# Patient Record
Sex: FEMALE | Race: WHITE | NOT HISPANIC OR LATINO
[De-identification: names, ages, dates, MRNs, and addresses within clinical notes are randomized per-mention and may not be internally consistent; named-entity substitution may affect disease eponyms.]

---

## 2014-05-07 RX ORDER — LISINOPRIL 2.5 MG/1
2 TABLET ORAL
Qty: 0 | Refills: 0 | COMMUNITY
Start: 2014-05-07

## 2014-05-07 RX ORDER — ATORVASTATIN CALCIUM 80 MG/1
1 TABLET, FILM COATED ORAL
Qty: 0 | Refills: 0 | COMMUNITY
Start: 2014-05-07

## 2017-03-10 ENCOUNTER — APPOINTMENT (OUTPATIENT)
Dept: CARDIOLOGY | Facility: CLINIC | Age: 82
End: 2017-03-10

## 2017-03-10 ENCOUNTER — NON-APPOINTMENT (OUTPATIENT)
Age: 82
End: 2017-03-10

## 2017-03-10 VITALS
DIASTOLIC BLOOD PRESSURE: 71 MMHG | BODY MASS INDEX: 22.63 KG/M2 | WEIGHT: 123 LBS | OXYGEN SATURATION: 94 % | HEIGHT: 62 IN | HEART RATE: 69 BPM | SYSTOLIC BLOOD PRESSURE: 123 MMHG

## 2017-07-11 ENCOUNTER — NON-APPOINTMENT (OUTPATIENT)
Age: 82
End: 2017-07-11

## 2017-07-11 ENCOUNTER — APPOINTMENT (OUTPATIENT)
Dept: CARDIOLOGY | Facility: CLINIC | Age: 82
End: 2017-07-11

## 2017-07-11 VITALS
SYSTOLIC BLOOD PRESSURE: 147 MMHG | WEIGHT: 130 LBS | BODY MASS INDEX: 23.78 KG/M2 | HEART RATE: 68 BPM | DIASTOLIC BLOOD PRESSURE: 55 MMHG | OXYGEN SATURATION: 96 % | RESPIRATION RATE: 16 BRPM

## 2017-12-18 ENCOUNTER — NON-APPOINTMENT (OUTPATIENT)
Age: 82
End: 2017-12-18

## 2017-12-18 ENCOUNTER — APPOINTMENT (OUTPATIENT)
Dept: CARDIOLOGY | Facility: CLINIC | Age: 82
End: 2017-12-18
Payer: MEDICAID

## 2017-12-18 VITALS
OXYGEN SATURATION: 95 % | HEIGHT: 62 IN | RESPIRATION RATE: 16 BRPM | BODY MASS INDEX: 23.37 KG/M2 | WEIGHT: 127 LBS | HEART RATE: 68 BPM | DIASTOLIC BLOOD PRESSURE: 77 MMHG | SYSTOLIC BLOOD PRESSURE: 135 MMHG

## 2017-12-18 PROCEDURE — 99215 OFFICE O/P EST HI 40 MIN: CPT

## 2017-12-18 PROCEDURE — 93000 ELECTROCARDIOGRAM COMPLETE: CPT

## 2018-03-06 ENCOUNTER — RX RENEWAL (OUTPATIENT)
Age: 83
End: 2018-03-06

## 2018-04-03 ENCOUNTER — RX RENEWAL (OUTPATIENT)
Age: 83
End: 2018-04-03

## 2018-04-20 ENCOUNTER — NON-APPOINTMENT (OUTPATIENT)
Age: 83
End: 2018-04-20

## 2018-04-20 ENCOUNTER — APPOINTMENT (OUTPATIENT)
Dept: CARDIOLOGY | Facility: CLINIC | Age: 83
End: 2018-04-20
Payer: MEDICAID

## 2018-04-20 VITALS
SYSTOLIC BLOOD PRESSURE: 156 MMHG | HEART RATE: 74 BPM | WEIGHT: 128 LBS | OXYGEN SATURATION: 96 % | BODY MASS INDEX: 23.55 KG/M2 | HEIGHT: 62 IN | DIASTOLIC BLOOD PRESSURE: 74 MMHG

## 2018-04-20 PROCEDURE — 99215 OFFICE O/P EST HI 40 MIN: CPT

## 2018-04-20 PROCEDURE — 93000 ELECTROCARDIOGRAM COMPLETE: CPT

## 2018-10-30 ENCOUNTER — APPOINTMENT (OUTPATIENT)
Dept: CARDIOLOGY | Facility: CLINIC | Age: 83
End: 2018-10-30
Payer: MEDICARE

## 2018-10-30 VITALS
DIASTOLIC BLOOD PRESSURE: 70 MMHG | WEIGHT: 128 LBS | BODY MASS INDEX: 23.55 KG/M2 | HEART RATE: 67 BPM | OXYGEN SATURATION: 95 % | SYSTOLIC BLOOD PRESSURE: 135 MMHG | HEIGHT: 62 IN

## 2018-10-30 PROCEDURE — 99215 OFFICE O/P EST HI 40 MIN: CPT

## 2018-10-30 PROCEDURE — 93000 ELECTROCARDIOGRAM COMPLETE: CPT

## 2019-02-15 ENCOUNTER — INPATIENT (INPATIENT)
Facility: HOSPITAL | Age: 84
LOS: 0 days | Discharge: HOME | End: 2019-02-16
Attending: STUDENT IN AN ORGANIZED HEALTH CARE EDUCATION/TRAINING PROGRAM | Admitting: STUDENT IN AN ORGANIZED HEALTH CARE EDUCATION/TRAINING PROGRAM
Payer: MEDICARE

## 2019-02-15 VITALS
SYSTOLIC BLOOD PRESSURE: 158 MMHG | DIASTOLIC BLOOD PRESSURE: 85 MMHG | OXYGEN SATURATION: 97 % | HEART RATE: 74 BPM | TEMPERATURE: 99 F | RESPIRATION RATE: 16 BRPM

## 2019-02-15 DIAGNOSIS — R07.9 CHEST PAIN, UNSPECIFIED: ICD-10-CM

## 2019-02-15 DIAGNOSIS — Z43.4 ENCOUNTER FOR ATTENTION TO OTHER ARTIFICIAL OPENINGS OF DIGESTIVE TRACT: Chronic | ICD-10-CM

## 2019-02-15 LAB
ALBUMIN SERPL ELPH-MCNC: 4.4 G/DL — SIGNIFICANT CHANGE UP (ref 3.3–5)
ALP SERPL-CCNC: 54 U/L — SIGNIFICANT CHANGE UP (ref 40–120)
ALT FLD-CCNC: 20 U/L — SIGNIFICANT CHANGE UP (ref 4–33)
ANION GAP SERPL CALC-SCNC: 15 MMO/L — HIGH (ref 7–14)
AST SERPL-CCNC: 31 U/L — SIGNIFICANT CHANGE UP (ref 4–32)
BASE EXCESS BLDV CALC-SCNC: 1.8 MMOL/L — SIGNIFICANT CHANGE UP
BASOPHILS # BLD AUTO: 0.05 K/UL — SIGNIFICANT CHANGE UP (ref 0–0.2)
BASOPHILS NFR BLD AUTO: 0.7 % — SIGNIFICANT CHANGE UP (ref 0–2)
BILIRUB SERPL-MCNC: 0.3 MG/DL — SIGNIFICANT CHANGE UP (ref 0.2–1.2)
BLOOD GAS VENOUS - CREATININE: 0.8 MG/DL — SIGNIFICANT CHANGE UP (ref 0.5–1.3)
BUN SERPL-MCNC: 25 MG/DL — HIGH (ref 7–23)
CALCIUM SERPL-MCNC: 9.7 MG/DL — SIGNIFICANT CHANGE UP (ref 8.4–10.5)
CHLORIDE BLDV-SCNC: 110 MMOL/L — HIGH (ref 96–108)
CHLORIDE SERPL-SCNC: 104 MMOL/L — SIGNIFICANT CHANGE UP (ref 98–107)
CO2 SERPL-SCNC: 22 MMOL/L — SIGNIFICANT CHANGE UP (ref 22–31)
CREAT SERPL-MCNC: 0.96 MG/DL — SIGNIFICANT CHANGE UP (ref 0.5–1.3)
EOSINOPHIL # BLD AUTO: 0.08 K/UL — SIGNIFICANT CHANGE UP (ref 0–0.5)
EOSINOPHIL NFR BLD AUTO: 1.1 % — SIGNIFICANT CHANGE UP (ref 0–6)
GAS PNL BLDV: 133 MMOL/L — LOW (ref 136–146)
GLUCOSE BLDV-MCNC: 94 — SIGNIFICANT CHANGE UP (ref 70–99)
GLUCOSE SERPL-MCNC: 94 MG/DL — SIGNIFICANT CHANGE UP (ref 70–99)
HCO3 BLDV-SCNC: 26 MMOL/L — SIGNIFICANT CHANGE UP (ref 20–27)
HCT VFR BLD CALC: 43 % — SIGNIFICANT CHANGE UP (ref 34.5–45)
HCT VFR BLDV CALC: 43.4 % — SIGNIFICANT CHANGE UP (ref 34.5–45)
HGB BLD-MCNC: 14 G/DL — SIGNIFICANT CHANGE UP (ref 11.5–15.5)
HGB BLDV-MCNC: 14.1 G/DL — SIGNIFICANT CHANGE UP (ref 11.5–15.5)
IMM GRANULOCYTES NFR BLD AUTO: 0.3 % — SIGNIFICANT CHANGE UP (ref 0–1.5)
LACTATE BLDV-MCNC: 1.5 MMOL/L — SIGNIFICANT CHANGE UP (ref 0.5–2)
LYMPHOCYTES # BLD AUTO: 1.8 K/UL — SIGNIFICANT CHANGE UP (ref 1–3.3)
LYMPHOCYTES # BLD AUTO: 24.7 % — SIGNIFICANT CHANGE UP (ref 13–44)
MCHC RBC-ENTMCNC: 30.9 PG — SIGNIFICANT CHANGE UP (ref 27–34)
MCHC RBC-ENTMCNC: 32.6 % — SIGNIFICANT CHANGE UP (ref 32–36)
MCV RBC AUTO: 94.9 FL — SIGNIFICANT CHANGE UP (ref 80–100)
MONOCYTES # BLD AUTO: 0.69 K/UL — SIGNIFICANT CHANGE UP (ref 0–0.9)
MONOCYTES NFR BLD AUTO: 9.5 % — SIGNIFICANT CHANGE UP (ref 2–14)
NEUTROPHILS # BLD AUTO: 4.64 K/UL — SIGNIFICANT CHANGE UP (ref 1.8–7.4)
NEUTROPHILS NFR BLD AUTO: 63.7 % — SIGNIFICANT CHANGE UP (ref 43–77)
NRBC # FLD: 0 K/UL — LOW (ref 25–125)
NT-PROBNP SERPL-SCNC: 1050 PG/ML — SIGNIFICANT CHANGE UP
PCO2 BLDV: 44 MMHG — SIGNIFICANT CHANGE UP (ref 41–51)
PH BLDV: 7.39 PH — SIGNIFICANT CHANGE UP (ref 7.32–7.43)
PLATELET # BLD AUTO: 171 K/UL — SIGNIFICANT CHANGE UP (ref 150–400)
PMV BLD: 11.5 FL — SIGNIFICANT CHANGE UP (ref 7–13)
PO2 BLDV: 63 MMHG — HIGH (ref 35–40)
POTASSIUM BLDV-SCNC: 4.5 MMOL/L — SIGNIFICANT CHANGE UP (ref 3.4–4.5)
POTASSIUM SERPL-MCNC: 4.8 MMOL/L — SIGNIFICANT CHANGE UP (ref 3.5–5.3)
POTASSIUM SERPL-SCNC: 4.8 MMOL/L — SIGNIFICANT CHANGE UP (ref 3.5–5.3)
PROT SERPL-MCNC: 7.1 G/DL — SIGNIFICANT CHANGE UP (ref 6–8.3)
RBC # BLD: 4.53 M/UL — SIGNIFICANT CHANGE UP (ref 3.8–5.2)
RBC # FLD: 13.3 % — SIGNIFICANT CHANGE UP (ref 10.3–14.5)
SAO2 % BLDV: 90.3 % — HIGH (ref 60–85)
SODIUM SERPL-SCNC: 141 MMOL/L — SIGNIFICANT CHANGE UP (ref 135–145)
TROPONIN T, HIGH SENSITIVITY: 14 NG/L — SIGNIFICANT CHANGE UP (ref ?–14)
WBC # BLD: 7.28 K/UL — SIGNIFICANT CHANGE UP (ref 3.8–10.5)
WBC # FLD AUTO: 7.28 K/UL — SIGNIFICANT CHANGE UP (ref 3.8–10.5)

## 2019-02-15 PROCEDURE — 71046 X-RAY EXAM CHEST 2 VIEWS: CPT | Mod: 26

## 2019-02-15 RX ORDER — ASPIRIN/CALCIUM CARB/MAGNESIUM 324 MG
324 TABLET ORAL ONCE
Qty: 0 | Refills: 0 | Status: COMPLETED | OUTPATIENT
Start: 2019-02-15 | End: 2019-02-15

## 2019-02-15 RX ADMIN — Medication 324 MILLIGRAM(S): at 22:50

## 2019-02-15 NOTE — ED ADULT NURSE NOTE - OBJECTIVE STATEMENT
pt a&o x3, Lithuanian speaking, son at bedside as preferred , sent by urgent care for abnormal ekg. pt states she had chest pain last night that felt similar to her MI in 2014, accompanied by sob and dizziness. currently denies pain. states she went to urgent care where she states they said "I cant tell if another MI." vs as documented. ekg obtained. right forearm 20g placed. labs drawn and sent. awaiting md eval nad note

## 2019-02-15 NOTE — ED PROVIDER NOTE - ATTENDING CONTRIBUTION TO CARE
Dr. Cosby: I have personally seen and examined this patient at the bedside. I have fully participated in the care of this patient. I have reviewed all pertinent clinical information, including history, physical exam, plan and the Resident's note and agree except as noted. HPI above as by me. PE above as by me. DDX high risk acs PLAN asa, xr, trops, admit

## 2019-02-15 NOTE — ED PROVIDER NOTE - PMH
Diverticulosis    GERD (gastroesophageal reflux disease)    Glaucoma    Hyperlipidemia    Hypertension

## 2019-02-15 NOTE — CONSULT NOTE ADULT - PROBLEM SELECTOR RECOMMENDATION 9
Patient presents with intermittent chest pain  Trend CE   TTE in AM to evaluate LV/RV function   consider Ischemic workup   monitor on telemetry

## 2019-02-15 NOTE — ED PROVIDER NOTE - OBJECTIVE STATEMENT
86F with hx of CAD s/p MI, Glaucoma, Parkinsons p/w left sided CP x1 week. pain has been intermittent and radiating to her left shoulder, sharp sticking sensation. also reports difficulty breathing associated with CP. States sx are similar to when she had a heart attack in the past. denies any current CP at this time. (+) dry cough. 86F with hx of CAD s/p MI, Glaucoma, Parkinsons p/w left sided CP x1 week. pain has been intermittent and radiating to her left shoulder, sharp sticking sensation. also reports difficulty breathing associated with CP. States sx are similar to when she had a heart attack in the past. denies any current CP at this time. (+) dry cough.     22:20 Cosby att: 86F h/o cad, 2014 mi no stents c/o cp x 1 wk. Patient Wolof and some English speaking, requests son translate rather than phone. Last week patient felt near faint, could not breathe and noted left sided pinching cp, intermitt rad to left chest. Occurs in her sleep. "Feels similar to 2014 heart attack." Missed an appointment with her Cardiologist Kayla Alas last week. Denies active cp. Per family neg change in skin color. Patient denies f, cough, melena.

## 2019-02-15 NOTE — ED ADULT TRIAGE NOTE - CHIEF COMPLAINT QUOTE
Pt c/o CP a few days ago and last night so [facility] and was told she has an abnormal EKG and should go to ER.  PMH MI 2014.  C/O dizziness

## 2019-02-15 NOTE — ED PROVIDER NOTE - CLINICAL SUMMARY MEDICAL DECISION MAKING FREE TEXT BOX
86F with CP and sob, r/o ACS. check labs with trop, ekg, cxr. asa. 86F with CP and sob, r/o ACS. check labs with trop, ekg, cxr. asa.    Alea attt: high risk acs PLAN asa, xr, trops, admit

## 2019-02-15 NOTE — ED ADULT NURSE NOTE - ED STAT RN HANDOFF DETAILS
pt a&o x3, nad noted. no c/o pain. connected to cardiac monitor. safety maintained. endorsed to brent penn

## 2019-02-15 NOTE — CONSULT NOTE ADULT - SUBJECTIVE AND OBJECTIVE BOX
Date of Admission: 2/15/2019    CHIEF COMPLAINT: Chest pain    HISTORY OF PRESENT ILLNESS:  This is a 86yoM w/ PMHx CAD w/ stents, HFrEF EF 35%, HTN, HLD presents with intermittent chest pain that started last week.  Patient states she experiences these episodes randomly while at rest for about 5 minutes and is relieved on its own.  Her son took her to the urgent care center and sent her to the ED to be evaluated.  While in ED, patient was loaded with ASA.  Initial hsT 14.  Upon examination, patient states her chest pain is resolved;  Denies any palpitations, fever, cough, chills, n/v/d.    Allergies  No Known Allergies    MEDICATIONS:  Aspirin 81 MG Oral Tablet Delayed Release; TAKE 1 TABLET DAILY  Atorvastatin Calcium 20 MG Oral Tablet; TAKE 1 TABLET DAILY  Dorzolamide HCl - 2 % Ophthalmic Solution; INSTILL 1 DROP IN THE LEFT EYE TWICE  DAILY  Latanoprost 0.005 % Ophthalmic Solution; INSTILL 1 DROP IN BOTH EYES AT BEDTIME  Lisinopril 2.5 MG Oral Tablet; Take 1 tablet daily  Metoprolol Succinate ER 25 MG Oral Tablet Extended Release 24 Hour; TAKE 1 TABLET  DAILY  Pantoprazole Sodium 40 MG Oral Tablet Delayed Release; TAKE 1 TABLET BY MOUTH  DAILY  Protonix 40 MG Oral Tablet Delayed Release; TAKE 1 TABLET DAILY  Refresh SOLN; INSTILL 1 DROP Twice daily  Valium 10 MG Oral Tablet; TAKE 1 TABLET 3 TIMES DAILY AS NEEDED    PAST MEDICAL & SURGICAL HISTORY:  Glaucoma  Diverticulosis  GERD (gastroesophageal reflux disease)  Hyperlipidemia  Hypertension  Cholecystostomy care    REVIEW OF SYSTEMS:  See HPI. Otherwise, 10 point ROS done and otherwise negative.    PHYSICAL EXAM:  T(C): 36.3 (02-15-19 @ 21:05), Max: 37.1 (02-15-19 @ 19:04)  HR: 66 (02-15-19 @ 21:05) (66 - 74)  BP: 171/57 (02-15-19 @ 21:05) (158/85 - 171/57)  RR: 18 (02-15-19 @ 21:05) (16 - 18)  SpO2: 100% (02-15-19 @ 21:05) (97% - 100%)  Wt(kg): --  I&O's Summary      Appearance: Normal	  HEENT:   Normal oral mucosa, PERRL, EOMI	  Lymphatic: No lymphadenopathy  Cardiovascular: Normal S1 S2, No JVD, No murmurs, No edema  Respiratory: Lungs clear to auscultation	  Psychiatry: A & O x 3, Mood & affect appropriate  Gastrointestinal:  Soft, Non-tender, + BS	  Skin: No rashes, No ecchymoses, No cyanosis	  Neurologic: Non-focal  Extremities: Normal range of motion, No clubbing, cyanosis or edema  Vascular: Peripheral pulses palpable 2+ bilaterally    LABS:	 	  CBC Full  -  ( 15 Feb 2019 21:09 )  WBC Count : 7.28 K/uL  Hemoglobin : 14.0 g/dL  Hematocrit : 43.0 %  Platelet Count - Automated : 171 K/uL  Mean Cell Volume : 94.9 fL  Mean Cell Hemoglobin : 30.9 pg  Mean Cell Hemoglobin Concentration : 32.6 %  Auto Neutrophil # : 4.64 K/uL  Auto Lymphocyte # : 1.80 K/uL  Auto Monocyte # : 0.69 K/uL  Auto Eosinophil # : 0.08 K/uL  Auto Basophil # : 0.05 K/uL  Auto Neutrophil % : 63.7 %  Auto Lymphocyte % : 24.7 %  Auto Monocyte % : 9.5 %  Auto Eosinophil % : 1.1 %  Auto Basophil % : 0.7 %    02-15    141  |  104  |  25<H>  ----------------------------<  94  4.8   |  22  |  0.96    Ca    9.7      15 Feb 2019 20:50    TPro  7.1  /  Alb  4.4  /  TBili  0.3  /  DBili  x   /  AST  31  /  ALT  20  /  AlkPhos  54  02-15      proBNP: Serum Pro-Brain Natriuretic Peptide: 1050 pg/mL (02-15 @ 20:50)

## 2019-02-15 NOTE — CONSULT NOTE ADULT - ASSESSMENT
86yoM w/ PMHx CAD w/ stents, HFrEF EF 35%, HTN, HLD presents with intermittent chest pain that started last week.  Patient states she experiences these episodes randomly while at rest for about 5 minutes and is relieved on its own.

## 2019-02-15 NOTE — ED ADULT NURSE NOTE - CHIEF COMPLAINT QUOTE
Pt c/o CP a few days ago and [facility] has [facility] abnormal [facility] and should go to ER.  PMH MI 2014.  C/O dizziness

## 2019-02-16 ENCOUNTER — TRANSCRIPTION ENCOUNTER (OUTPATIENT)
Age: 84
End: 2019-02-16

## 2019-02-16 VITALS
DIASTOLIC BLOOD PRESSURE: 67 MMHG | SYSTOLIC BLOOD PRESSURE: 140 MMHG | HEART RATE: 70 BPM | OXYGEN SATURATION: 98 % | TEMPERATURE: 98 F | RESPIRATION RATE: 18 BRPM

## 2019-02-16 DIAGNOSIS — I50.9 HEART FAILURE, UNSPECIFIED: ICD-10-CM

## 2019-02-16 DIAGNOSIS — I50.22 CHRONIC SYSTOLIC (CONGESTIVE) HEART FAILURE: ICD-10-CM

## 2019-02-16 DIAGNOSIS — I25.10 ATHEROSCLEROTIC HEART DISEASE OF NATIVE CORONARY ARTERY WITHOUT ANGINA PECTORIS: ICD-10-CM

## 2019-02-16 DIAGNOSIS — E78.5 HYPERLIPIDEMIA, UNSPECIFIED: ICD-10-CM

## 2019-02-16 DIAGNOSIS — I10 ESSENTIAL (PRIMARY) HYPERTENSION: ICD-10-CM

## 2019-02-16 DIAGNOSIS — K21.9 GASTRO-ESOPHAGEAL REFLUX DISEASE WITHOUT ESOPHAGITIS: ICD-10-CM

## 2019-02-16 DIAGNOSIS — R07.9 CHEST PAIN, UNSPECIFIED: ICD-10-CM

## 2019-02-16 DIAGNOSIS — Z29.9 ENCOUNTER FOR PROPHYLACTIC MEASURES, UNSPECIFIED: ICD-10-CM

## 2019-02-16 LAB
ANION GAP SERPL CALC-SCNC: 13 MMO/L — SIGNIFICANT CHANGE UP (ref 7–14)
BASOPHILS # BLD AUTO: 0.04 K/UL — SIGNIFICANT CHANGE UP (ref 0–0.2)
BASOPHILS NFR BLD AUTO: 0.6 % — SIGNIFICANT CHANGE UP (ref 0–2)
BUN SERPL-MCNC: 21 MG/DL — SIGNIFICANT CHANGE UP (ref 7–23)
CALCIUM SERPL-MCNC: 9.2 MG/DL — SIGNIFICANT CHANGE UP (ref 8.4–10.5)
CHLORIDE SERPL-SCNC: 104 MMOL/L — SIGNIFICANT CHANGE UP (ref 98–107)
CHOLEST SERPL-MCNC: 126 MG/DL — SIGNIFICANT CHANGE UP (ref 120–199)
CK MB BLD-MCNC: 2.87 NG/ML — SIGNIFICANT CHANGE UP (ref 1–4.7)
CK MB BLD-MCNC: 2.99 NG/ML — SIGNIFICANT CHANGE UP (ref 1–4.7)
CK MB BLD-MCNC: 3.28 NG/ML — SIGNIFICANT CHANGE UP (ref 1–4.7)
CK MB BLD-MCNC: SIGNIFICANT CHANGE UP (ref 0–2.5)
CK SERPL-CCNC: 115 U/L — SIGNIFICANT CHANGE UP (ref 25–170)
CK SERPL-CCNC: 137 U/L — SIGNIFICANT CHANGE UP (ref 25–170)
CK SERPL-CCNC: 149 U/L — SIGNIFICANT CHANGE UP (ref 25–170)
CO2 SERPL-SCNC: 25 MMOL/L — SIGNIFICANT CHANGE UP (ref 22–31)
CREAT SERPL-MCNC: 0.87 MG/DL — SIGNIFICANT CHANGE UP (ref 0.5–1.3)
EOSINOPHIL # BLD AUTO: 0.08 K/UL — SIGNIFICANT CHANGE UP (ref 0–0.5)
EOSINOPHIL NFR BLD AUTO: 1.2 % — SIGNIFICANT CHANGE UP (ref 0–6)
GLUCOSE SERPL-MCNC: 84 MG/DL — SIGNIFICANT CHANGE UP (ref 70–99)
HBA1C BLD-MCNC: 5.7 % — HIGH (ref 4–5.6)
HCT VFR BLD CALC: 42.8 % — SIGNIFICANT CHANGE UP (ref 34.5–45)
HDLC SERPL-MCNC: 54 MG/DL — SIGNIFICANT CHANGE UP (ref 45–65)
HGB BLD-MCNC: 14.1 G/DL — SIGNIFICANT CHANGE UP (ref 11.5–15.5)
IMM GRANULOCYTES NFR BLD AUTO: 0.2 % — SIGNIFICANT CHANGE UP (ref 0–1.5)
LIPID PNL WITH DIRECT LDL SERPL: 67 MG/DL — SIGNIFICANT CHANGE UP
LYMPHOCYTES # BLD AUTO: 1.41 K/UL — SIGNIFICANT CHANGE UP (ref 1–3.3)
LYMPHOCYTES # BLD AUTO: 21.7 % — SIGNIFICANT CHANGE UP (ref 13–44)
MAGNESIUM SERPL-MCNC: 1.9 MG/DL — SIGNIFICANT CHANGE UP (ref 1.6–2.6)
MCHC RBC-ENTMCNC: 31.5 PG — SIGNIFICANT CHANGE UP (ref 27–34)
MCHC RBC-ENTMCNC: 32.9 % — SIGNIFICANT CHANGE UP (ref 32–36)
MCV RBC AUTO: 95.5 FL — SIGNIFICANT CHANGE UP (ref 80–100)
MONOCYTES # BLD AUTO: 0.55 K/UL — SIGNIFICANT CHANGE UP (ref 0–0.9)
MONOCYTES NFR BLD AUTO: 8.5 % — SIGNIFICANT CHANGE UP (ref 2–14)
NEUTROPHILS # BLD AUTO: 4.41 K/UL — SIGNIFICANT CHANGE UP (ref 1.8–7.4)
NEUTROPHILS NFR BLD AUTO: 67.8 % — SIGNIFICANT CHANGE UP (ref 43–77)
NRBC # FLD: 0 K/UL — LOW (ref 25–125)
PHOSPHATE SERPL-MCNC: 3 MG/DL — SIGNIFICANT CHANGE UP (ref 2.5–4.5)
PLATELET # BLD AUTO: 182 K/UL — SIGNIFICANT CHANGE UP (ref 150–400)
PMV BLD: 11.5 FL — SIGNIFICANT CHANGE UP (ref 7–13)
POTASSIUM SERPL-MCNC: 4.8 MMOL/L — SIGNIFICANT CHANGE UP (ref 3.5–5.3)
POTASSIUM SERPL-SCNC: 4.8 MMOL/L — SIGNIFICANT CHANGE UP (ref 3.5–5.3)
RBC # BLD: 4.48 M/UL — SIGNIFICANT CHANGE UP (ref 3.8–5.2)
RBC # FLD: 13.2 % — SIGNIFICANT CHANGE UP (ref 10.3–14.5)
SODIUM SERPL-SCNC: 142 MMOL/L — SIGNIFICANT CHANGE UP (ref 135–145)
TRIGL SERPL-MCNC: 95 MG/DL — SIGNIFICANT CHANGE UP (ref 10–149)
TROPONIN T, HIGH SENSITIVITY: 14 NG/L — SIGNIFICANT CHANGE UP (ref ?–14)
TROPONIN T, HIGH SENSITIVITY: 16 NG/L — SIGNIFICANT CHANGE UP (ref ?–14)
TSH SERPL-MCNC: 1.85 UIU/ML — SIGNIFICANT CHANGE UP (ref 0.27–4.2)
WBC # BLD: 6.5 K/UL — SIGNIFICANT CHANGE UP (ref 3.8–10.5)
WBC # FLD AUTO: 6.5 K/UL — SIGNIFICANT CHANGE UP (ref 3.8–10.5)

## 2019-02-16 PROCEDURE — 12345: CPT | Mod: NC

## 2019-02-16 PROCEDURE — 99223 1ST HOSP IP/OBS HIGH 75: CPT | Mod: GC

## 2019-02-16 PROCEDURE — 93306 TTE W/DOPPLER COMPLETE: CPT | Mod: 26

## 2019-02-16 PROCEDURE — 99222 1ST HOSP IP/OBS MODERATE 55: CPT

## 2019-02-16 RX ORDER — LISINOPRIL 2.5 MG/1
5 TABLET ORAL AT BEDTIME
Qty: 0 | Refills: 0 | Status: DISCONTINUED | OUTPATIENT
Start: 2019-02-16 | End: 2019-02-16

## 2019-02-16 RX ORDER — PANTOPRAZOLE SODIUM 20 MG/1
40 TABLET, DELAYED RELEASE ORAL
Qty: 0 | Refills: 0 | Status: DISCONTINUED | OUTPATIENT
Start: 2019-02-16 | End: 2019-02-16

## 2019-02-16 RX ORDER — PANTOPRAZOLE SODIUM 20 MG/1
1 TABLET, DELAYED RELEASE ORAL
Qty: 0 | Refills: 0 | COMMUNITY

## 2019-02-16 RX ORDER — LISINOPRIL 2.5 MG/1
1 TABLET ORAL
Qty: 30 | Refills: 0 | OUTPATIENT
Start: 2019-02-16 | End: 2019-03-17

## 2019-02-16 RX ORDER — METOPROLOL TARTRATE 50 MG
25 TABLET ORAL DAILY
Qty: 0 | Refills: 0 | Status: DISCONTINUED | OUTPATIENT
Start: 2019-02-16 | End: 2019-02-16

## 2019-02-16 RX ORDER — SODIUM CHLORIDE 9 MG/ML
3 INJECTION INTRAMUSCULAR; INTRAVENOUS; SUBCUTANEOUS EVERY 8 HOURS
Qty: 0 | Refills: 0 | Status: DISCONTINUED | OUTPATIENT
Start: 2019-02-16 | End: 2019-02-16

## 2019-02-16 RX ORDER — HEPARIN SODIUM 5000 [USP'U]/ML
5000 INJECTION INTRAVENOUS; SUBCUTANEOUS EVERY 12 HOURS
Qty: 0 | Refills: 0 | Status: DISCONTINUED | OUTPATIENT
Start: 2019-02-16 | End: 2019-02-16

## 2019-02-16 RX ORDER — ASPIRIN/CALCIUM CARB/MAGNESIUM 324 MG
81 TABLET ORAL DAILY
Qty: 0 | Refills: 0 | Status: DISCONTINUED | OUTPATIENT
Start: 2019-02-16 | End: 2019-02-16

## 2019-02-16 RX ORDER — ASPIRIN/CALCIUM CARB/MAGNESIUM 324 MG
81 TABLET ORAL DAILY
Qty: 0 | Refills: 0 | Status: DISCONTINUED | OUTPATIENT
Start: 2019-02-17 | End: 2019-02-16

## 2019-02-16 RX ORDER — ATORVASTATIN CALCIUM 80 MG/1
40 TABLET, FILM COATED ORAL AT BEDTIME
Qty: 0 | Refills: 0 | Status: DISCONTINUED | OUTPATIENT
Start: 2019-02-16 | End: 2019-02-16

## 2019-02-16 RX ADMIN — PANTOPRAZOLE SODIUM 40 MILLIGRAM(S): 20 TABLET, DELAYED RELEASE ORAL at 09:44

## 2019-02-16 RX ADMIN — SODIUM CHLORIDE 3 MILLILITER(S): 9 INJECTION INTRAMUSCULAR; INTRAVENOUS; SUBCUTANEOUS at 13:59

## 2019-02-16 RX ADMIN — Medication 25 MILLIGRAM(S): at 09:44

## 2019-02-16 RX ADMIN — SODIUM CHLORIDE 3 MILLILITER(S): 9 INJECTION INTRAMUSCULAR; INTRAVENOUS; SUBCUTANEOUS at 05:12

## 2019-02-16 NOTE — DISCHARGE NOTE ADULT - MEDICATION SUMMARY - MEDICATIONS TO CHANGE
I will SWITCH the dose or number of times a day I take the medications listed below when I get home from the hospital:    lisinopril 2.5 mg oral tablet  -- 2 tab(s) by mouth once a day

## 2019-02-16 NOTE — H&P ADULT - PROBLEM SELECTOR PLAN 2
cont statin ProBNP = 1050, but appears euvolemic at present  ECG = sinus w/ freq PVCx & fusion complexes at 72 bpm, LAD, LVH, Q-waves in V2, V3  cont lisinopril, metoprolol, other cardioprotective meds  intake/output, weight daily, fluid restriction  echo ordered  f/u AM lab-work, including TSH level EF = 30 to 35% (TTE of 06/26/2014)  ProBNP = 1050, but appears euvolemic at present  ECG = sinus w/ freq PVCx & fusion complexes at 72 bpm, LAD, LVH, Q-waves in V2, V3  cont lisinopril, metoprolol, other cardioprotective meds  intake/output, weight daily, fluid restriction  echo ordered  f/u AM lab-work, including TSH level

## 2019-02-16 NOTE — PROGRESS NOTE ADULT - PROBLEM SELECTOR PLAN 1
Admit to tele  Pain reportedly radiating to the left shoulder  trop = 14 --> 14  proBNP = 1050  history of CAD, HTN, CHF w/ EF = ~ 35%  CXR negative for any acute findings  Echo unchanged from prior   cont asa   Ischemic eval can be considered as outpatient ACS ruled out, etiology of chest pain is unclear   trop = 14 --> 14  proBNP = 1050  history of CAD, HTN, CHF w/ EF = ~ 35%  CXR negative for any acute findings  Echo unchanged from prior   cont asa   Ischemic eval can be considered as outpatient

## 2019-02-16 NOTE — DISCHARGE NOTE ADULT - CARE PROVIDER_API CALL
Kayla Alas)  Cardiovascular Disease  [facility], [address] [place] [address] [id number] [place] [id number]  Phone: [phone]  Fax: [phone]  Follow Up Time:

## 2019-02-16 NOTE — PROGRESS NOTE ADULT - PROBLEM SELECTOR PLAN 2
EF = 30 to 35% (TTE of 06/26/2014)  ProBNP = 1050, but appears euvolemic at present  ECG = sinus w/ freq PVCx & fusion complexes at 72 bpm, LAD, LVH, Q-waves in V2, V3  cont lisinopril, metoprolol, other cardioprotective meds  intake/output, weight daily, fluid restriction  echo unchanged from prior

## 2019-02-16 NOTE — DISCHARGE NOTE ADULT - MEDICATION SUMMARY - MEDICATIONS TO TAKE
I will START or STAY ON the medications listed below when I get home from the hospital:    aspirin 81 mg oral delayed release tablet  -- 1 tab(s) by mouth once a day  -- Indication: For Heart protector    Lipitor 40 mg oral tablet  -- 1 tab(s) by mouth once a day    does not take daily  -- Indication: For HLD    metoprolol succinate 25 mg oral tablet, extended release  -- 1 tab(s) by mouth once a day  -- Indication: For HTN    Protonix 40 mg oral delayed release tablet  -- 1 tab(s) by mouth once a day  -- Indication: For GERD I will START or STAY ON the medications listed below when I get home from the hospital:    aspirin 81 mg oral delayed release tablet  -- 1 tab(s) by mouth once a day  -- Indication: For Heart protector    Prinivil 5 mg oral tablet  -- 1 tab(s) by mouth once a day (at bedtime)  -- Indication: For HTN    Lipitor 40 mg oral tablet  -- 1 tab(s) by mouth once a day    does not take daily  -- Indication: For HLD    metoprolol succinate 25 mg oral tablet, extended release  -- 1 tab(s) by mouth once a day  -- Indication: For HTN    Protonix 40 mg oral delayed release tablet  -- 1 tab(s) by mouth once a day  -- Indication: For GERD

## 2019-02-16 NOTE — H&P ADULT - NSHPLABSRESULTS_GEN_ALL_CORE
EKG: sinus with frequent PVC HR: 72 TWI in V1-V2                          14.0   7.28  )-----------( 171      ( 15 Feb 2019 21:09 )             43.0     02-15    141  |  104  |  25<H>  ----------------------------<  94  4.8   |  22  |  0.96    Ca    9.7      15 Feb 2019 20:50    TPro  7.1  /  Alb  4.4  /  TBili  0.3  /  DBili  x   /  AST  31  /  ALT  20  /  AlkPhos  54  02-15    Troponin T, High Sensitivity: 14: ---------------------***PLEASE NOTE***----------------------  Rapid changes upward or downward in high-sensitivity  troponin levels strongly suggest acute myocardial injury.  Hemolysis may falsely lower results. Renal impairment may  increase results.    Normal: <6 - 14 ng/L  Indeterminate: 15 - 51 ng/L  Elevated: >51 ng/L    Please see "[email or web address]" on the [employer]  intranet for more information. ng/L (02.15.19 @ 22:55) EKG: sinus with frequent PVC HR: 72 TWI in V1-V2                          14.0   7.28  )-----------( 171      ( 15 Feb 2019 21:09 )             43.0     02-15    141  |  104  |  25<H>  ----------------------------<  94  4.8   |  22  |  0.96    Ca    9.7      15 Feb 2019 20:50    TPro  7.1  /  Alb  4.4  /  TBili  0.3  /  DBili  x   /  AST  31  /  ALT  20  /  AlkPhos  54  02-15    Troponin T, High Sensitivity: 14: ---------------------***PLEASE NOTE***----------------------  Rapid changes upward or downward in high-sensitivity  troponin levels strongly suggest acute myocardial injury.  Hemolysis may falsely lower results. Renal impairment may  increase results.    Normal: <6 - 14 ng/L  Indeterminate: 15 - 51 ng/L  Elevated: >51 ng/L    Please see "[email or web address]" on the [employer]  intranet for more information. ng/L (02.15.19 @ 22:55)    =============================================================    ECG = sinus w/ freq PVCx & fusion complexes at 72 bpm, LAD, LVH, Q-waves in V2, V3

## 2019-02-16 NOTE — PROGRESS NOTE ADULT - ASSESSMENT
87 y/o F with hx of late presentation AWSTEMI, CAD [no stents], HTN, HLD, parkinson's, diverticulosis, duodenal ulcers,  CHF EF: 35% presents with intermittent ?chest pain and palpitations.

## 2019-02-16 NOTE — DISCHARGE NOTE ADULT - CARE PLAN
Principal Discharge DX:	Other chest pain  Goal:	Followup with PMD and take all medications prescribed.  Assessment and plan of treatment:	Followup with PMD and take all medications prescribed. Low salt, low fat, low cholesterol diet  Secondary Diagnosis:	Essential hypertension  Goal:	Followup with PMD and take all medications prescribed.  Assessment and plan of treatment:	Followup with PMD and take all medications prescribed. Low salt, low fat, low cholesterol diet  Secondary Diagnosis:	Chronic systolic heart failure  Goal:	Followup with PMD and take all medications prescribed.  Assessment and plan of treatment:	Followup with PMD and take all medications prescribed. Low salt, low fat, low cholesterol diet  Secondary Diagnosis:	Other hyperlipidemia  Goal:	Followup with PMD and take all medications prescribed.  Assessment and plan of treatment:	Followup with PMD and take all medications prescribed. Low salt, low fat, low cholesterol diet

## 2019-02-16 NOTE — PROGRESS NOTE ADULT - SUBJECTIVE AND OBJECTIVE BOX
Patient is a 86y old  Female who presents with a chief complaint of chest pain (16 Feb 2019 12:25)      SUBJECTIVE / OVERNIGHT EVENTS: No chest pain, no palpitations, no SOB.     MEDICATIONS  (STANDING):  atorvastatin 40 milliGRAM(s) Oral at bedtime  heparin  Injectable 5000 Unit(s) SubCutaneous every 12 hours  lisinopril 5 milliGRAM(s) Oral at bedtime  metoprolol succinate ER 25 milliGRAM(s) Oral daily  pantoprazole    Tablet 40 milliGRAM(s) Oral before breakfast  sodium chloride 0.9% lock flush 3 milliLiter(s) IV Push every 8 hours    MEDICATIONS  (PRN):      Vital Signs Last 24 Hrs  T(C): 36.3 (16 Feb 2019 03:30), Max: 37.1 (15 Feb 2019 19:04)  T(F): 97.4 (16 Feb 2019 03:30), Max: 98.7 (15 Feb 2019 19:04)  HR: 63 (16 Feb 2019 03:30) (56 - 74)  BP: 147/74 (16 Feb 2019 03:30) (132/73 - 171/57)  BP(mean): --  RR: 17 (16 Feb 2019 03:30) (16 - 20)  SpO2: 96% (16 Feb 2019 03:30) (95% - 100%)  CAPILLARY BLOOD GLUCOSE        I&O's Summary      PHYSICAL EXAM:  GENERAL: NAD, well-developed  HEAD:  Atraumatic, Normocephalic  EYES: EOMI, PERRLA, conjunctiva and sclera clear  NECK: Supple, No JVD  CHEST/LUNG: Clear to auscultation bilaterally; No wheeze  HEART: Regular rate and rhythm; No murmurs, rubs, or gallops  ABDOMEN: Soft, Nontender, Nondistended; Bowel sounds present  EXTREMITIES:  2+ Peripheral Pulses, No clubbing, cyanosis, or edema  PSYCH: AAOx3  NEUROLOGY: non-focal  SKIN: No rashes or lesions    LABS:                        14.1   6.50  )-----------( 182      ( 16 Feb 2019 07:05 )             42.8     02-16    142  |  104  |  21  ----------------------------<  84  4.8   |  25  |  0.87    Ca    9.2      16 Feb 2019 07:05  Phos  3.0     02-16  Mg     1.9     02-16    TPro  7.1  /  Alb  4.4  /  TBili  0.3  /  DBili  x   /  AST  31  /  ALT  20  /  AlkPhos  54  02-15      CARDIAC MARKERS ( 16 Feb 2019 07:05 )  x     / x     / 115 u/L / 2.99 ng/mL / x      CARDIAC MARKERS ( 15 Feb 2019 22:55 )  x     / x     / 137 u/L / 2.87 ng/mL / x      CARDIAC MARKERS ( 15 Feb 2019 20:50 )  x     / x     / 149 u/L / 3.28 ng/mL / x              RADIOLOGY & ADDITIONAL TESTS:    Imaging Personally Reviewed:  Tele reviewed: Sinus milo 57bpm     Consultant(s) Notes Reviewed:      Care Discussed with Consultants/Other Providers:

## 2019-02-16 NOTE — DISCHARGE NOTE ADULT - HOSPITAL COURSE
85 y/o F with hx of late presentation AWSTEMI, CAD [no stents], HTN, HLD, parkinson's, diverticulosis duodenal ulcers,  CHF EF: 35% presents with intermittent ?chest pain and palpitations. admitted to r/o ACS  Chest pain.  Plan: Admit to tele  Pain reportedly radiating to the left shoulder  trop = 14 --> 14  proBNP = 1050  history of CAD, HTN, CHF w/ EF = ~ 35%  CXR negative for any acute findings  check cbc, bmp, a1c, flp,t sh, trend CE  echo ordered   cards consult appreciated   cont asa   consider ischemic eval  Discussed with Dr. Padron.   Chronic systolic heart failure. Plan: EF = 30 to 35% (TTE of 06/26/2014)  ProBNP = 1050, but appears euvolemic at present  ECG = sinus w/ freq PVCx & fusion complexes at 72 bpm, LAD, LVH, Q-waves in V2, V3  cont lisinopril, metoprolol, other cardioprotective meds  intake/output, weight daily, fluid restriction  echo ordered  f/u AM lab-work, including TSH level.Hypertension.  Plan: BP intermittently elevated; max SBP = 171  cont metoprolol 25 mg  modify therapy, as needed.   Coronary artery disease.  Plan: cont asa ,statin.   Hyperlipidemia.  Plan: cont statin.   GERD (gastroesophageal reflux disease). Plan: cont PPI  HOB elevation.  2/16- As per attending, patient stable for discharge. 85 y/o F with hx of CAD s/p MI, HTN, HLD, parkinson's, diverticulosis, duodenal ulcers, chronic systolic heart failure with EF of 35%, presents with intermittent chest pain and palpitations. Patient was admitted to telemetry to r/o ACS.   EKG with no acute ischemic changes. Trop 14 --> 14. No events on telemetry. CXR was clear. Patient seen by cardiology. Echo was done and had no significant changes from prior. Etiology of chest pain is unclear, but ACS was ruled out. Continue current cardiac medications. Patient cleared by cardiology for discharge.

## 2019-02-16 NOTE — H&P ADULT - ASSESSMENT
85 y/o F with hx of late presentation AWSTEMI, CAD [no stents], HTN, HLD, parkinson's, diverticulosis duodenal ulcers,  CHF EF: 35% presents with intermittent ?chest pain and palpitations. admitted to r/o ACS

## 2019-02-16 NOTE — H&P ADULT - PMH
Coronary artery disease  s/p late presentation AWSTEMI  Proximal LAD: The distal vessel was supplied by collaterals from  the right posterior descending. There was a 100 % stenosis. There was SHIELA  grade 0 flow through the vessel (no flow).  Diverticulosis    GERD (gastroesophageal reflux disease)    Glaucoma    Hyperlipidemia    Hypertension    Parkinson disease Congestive heart failure    Coronary artery disease  s/p late presentation AWSTEMI  Proximal LAD: The distal vessel was supplied by collaterals from  the right posterior descending. There was a 100 % stenosis. There was SHIELA  grade 0 flow through the vessel (no flow).  Diverticulosis    GERD (gastroesophageal reflux disease)    Glaucoma    Hyperlipidemia    Hypertension    Parkinson disease Congestive heart failure  ~ EF = 30 to 35% (06/26/2014)  Coronary artery disease  s/p late presentation AWSTEMI  Proximal LAD: The distal vessel was supplied by collaterals from  the right posterior descending. There was a 100 % stenosis. There was SHIELA  grade 0 flow through the vessel (no flow).  Diverticulosis    GERD (gastroesophageal reflux disease)    Glaucoma    Hyperlipidemia    Hypertension    Parkinson disease

## 2019-02-16 NOTE — DISCHARGE NOTE ADULT - PATIENT PORTAL LINK FT
You can access the [facility] Patient Portal, offered by [facility], by registering with the following website: [email or web address]

## 2019-02-16 NOTE — H&P ADULT - PROBLEM SELECTOR PLAN 1
Admit to tele  check cbc, bmp, a1c, flp,t sh, trend CE  echo ordered   cards consult appreciated   cont asa   consider ischemic eval Admit to tele  check cbc, bmp, a1c, flp,t sh, trend CE  echo ordered   cards consult appreciated   cont asa   consider ischemic eval  Discussed with Dr. Padron Admit to tele  Pain reportedly radiating to the left shoulder  trop = 14 --> 14  proBNP = 1050  history of CAD, HTN, CHF w/ EF = ~ 35%  CXR negative for any acute findings  check cbc, bmp, a1c, flp,t sh, trend CE  echo ordered   cards consult appreciated   cont asa   consider ischemic eval  Discussed with Dr. Padron

## 2019-02-16 NOTE — H&P ADULT - PROBLEM SELECTOR PLAN 3
cont metoprolol BP intermittently elevated; max SBP = 171  cont metoprolol 25 mg  modify therapy, as needed

## 2019-02-16 NOTE — H&P ADULT - HISTORY OF PRESENT ILLNESS
87 y/o F with hx of late presentation AWSTEMI, CAD [no stents], HTN, HLD, parkinson's, diverticulosis duodenal ulcers,  CHF EF: 35% presents with intermittent ?chest pain and palpitations. Patient states she has been having intermittent chest pain since her MI in the past. Over the last week, she noticed she was having palpitations and been feeling weaker than usual. She also had chest pain. Patient was unable to specify when exactly her last episode of chest pain despite multiple attempts with the . She states these symptoms are similar symptoms she had when she had the heart attack in 2014. Denies fever, chills, cough, falls, LOC, SOb, abdominal pain, nausea, vomiting, melena, hematochezia, LE edema or dysuria.     states she has chronic dyspnea, but can walk up to 1 hour without dyspnea. 85 y/o F with hx of late presentation AWSTEMI, CAD [no stents], HTN, HLD, parkinson's, diverticulosis duodenal ulcers,  CHF EF: 35% presents with intermittent ?chest pain and palpitations. Patient states she has been having intermittent chest pain since her MI in the past. Over the last week, she noticed she was having palpitations and been feeling weaker than usual. She also had chest pain. Patient was unable to specify when exactly her last episode of chest pain despite multiple attempts with the . She states these symptoms are similar symptoms she had when she had the heart attack in 2014. Denies fever, chills, cough, falls, LOC, SOb, abdominal pain, nausea, vomiting, melena, hematochezia, LE edema or dysuria.     states she has chronic dyspnea, but can walk up to 1 hour without dyspnea.     Vital signs upon ED presentation as follows: BP = 158/85, HR = 74, RR = 16, T = 37.1 C (98.7 F), O2 Sat = 97% on RA.  Diagnosed with Chest Pain.

## 2019-02-16 NOTE — DISCHARGE NOTE ADULT - ADDITIONAL INSTRUCTIONS
Follow up with Cardiologist and PMD within one week of discharge. Call for appointment. Return to ED for any concerning symptoms. Continue medications as prescribed. Low salt, low fat, low cholesterol diet. Followup with Dr. Alas Phone: [phone]

## 2019-03-19 ENCOUNTER — APPOINTMENT (OUTPATIENT)
Dept: CARDIOLOGY | Facility: CLINIC | Age: 84
End: 2019-03-19
Payer: MEDICARE

## 2019-03-19 VITALS
OXYGEN SATURATION: 95 % | HEIGHT: 62 IN | SYSTOLIC BLOOD PRESSURE: 126 MMHG | DIASTOLIC BLOOD PRESSURE: 65 MMHG | HEART RATE: 63 BPM | BODY MASS INDEX: 23 KG/M2 | WEIGHT: 125 LBS

## 2019-03-19 PROBLEM — G20 PARKINSON'S DISEASE: Chronic | Status: ACTIVE | Noted: 2019-02-16

## 2019-03-19 PROCEDURE — 99214 OFFICE O/P EST MOD 30 MIN: CPT

## 2019-03-19 PROCEDURE — 93000 ELECTROCARDIOGRAM COMPLETE: CPT

## 2019-03-19 NOTE — PHYSICAL EXAM
[General Appearance - Well Developed] : well developed [Well Groomed] : well groomed [Normal Appearance] : normal appearance [No Deformities] : no deformities [General Appearance - Well Nourished] : well nourished [General Appearance - In No Acute Distress] : no acute distress [Normal Jugular Venous A Waves Present] : normal jugular venous A waves present [Normal Jugular Venous V Waves Present] : normal jugular venous V waves present [No Jugular Venous Steinberg A Waves] : no jugular venous steinberg A waves [Respiration, Rhythm And Depth] : normal respiratory rhythm and effort [Auscultation Breath Sounds / Voice Sounds] : lungs were clear to auscultation bilaterally [Exaggerated Use Of Accessory Muscles For Inspiration] : no accessory muscle use [Heart Sounds] : normal S1 and S2 [Heart Rate And Rhythm] : heart rate and rhythm were normal [Abdomen Soft] : soft [Murmurs] : no murmurs present [Abdomen Tenderness] : non-tender [Abnormal Walk] : normal gait [Abdomen Mass (___ Cm)] : no abdominal mass palpated [Nail Clubbing] : no clubbing of the fingernails [Gait - Sufficient For Exercise Testing] : the gait was sufficient for exercise testing [Cyanosis, Localized] : no localized cyanosis [Petechial Hemorrhages (___cm)] : no petechial hemorrhages [Skin Color & Pigmentation] : normal skin color and pigmentation [] : no rash [No Venous Stasis] : no venous stasis [No Skin Ulcers] : no skin ulcer [Skin Lesions] : no skin lesions [No Xanthoma] : no  xanthoma was observed [Oriented To Time, Place, And Person] : oriented to person, place, and time [Affect] : the affect was normal [Mood] : the mood was normal [No Anxiety] : not feeling anxious

## 2019-03-19 NOTE — HISTORY OF PRESENT ILLNESS
[FreeTextEntry1] : Here for followup. Was admitted in February 2019 for chest pain. Had Echo that was unchanged from previous. Here today with her son. No new complaints.\par \par 1. CAD- AWMI in 2014, Late presentation and managed medically since that timedue to no viability present on viabilty study. \par On ASA daily, EKG reviewed and unchanged from previous\par 2. Chronic systolic heart failure- TTE done in Feb as shown:\par  1. Mitral annular calcification, otherwise normal mitral valve. Minimal mitral regurgitation. 2. Normal left ventricular internal dimensions and wall thicknesses. 3. Endocardium not well visualized; grossly severe segmental left ventricular systolic dysfunction.  Hypokinesis of the apex, mid to distal septum, and mid to distal anterior wall.  Endocardial visualization enhanced with intravenous injection of echo contrast (Definity).  No LV thrombus seen. 4. The right ventricle is not well visualized; grossly normal right ventricular systolic function. 5. Estimated right ventricular systolic pressure equals 57 mm Hg, assuming right atrial pressure equals 10 mm Hg, consistent with moderate pulmonary hypertension. (No changes from previous)\par Currently on Lisinopril 2.5 mg daily and Toprol 25 mg daily. Condition is stable. \par 2. HTN- Controlled on previous meds\par 3. HLD- On daily statin, will continue

## 2019-03-19 NOTE — DISCUSSION/SUMMARY
[FreeTextEntry1] : 87 year old woman with CAD ICM HTN HLD here for followup \par 1. CAD- AWMI in 2014, Late presentation and managed medically since that time due to no viability present on viabilty study. \par On ASA daily, EKG reviewed and unchanged from previous\par 2. Chronic systolic heart failure- TTE done in Feb as shown: severe segmental LV dysfunction, unchanged from previous studies\par Currently on Lisinopril 2.5 mg daily and Toprol 25 mg daily. Condition is stable. \par 2. HTN- Controlled on previous meds\par 3. HLD- On daily statin, will continue \par 4. FU in 3 -4 months

## 2019-04-01 ENCOUNTER — RX RENEWAL (OUTPATIENT)
Age: 84
End: 2019-04-01

## 2019-05-02 ENCOUNTER — RX RENEWAL (OUTPATIENT)
Age: 84
End: 2019-05-02

## 2019-07-23 ENCOUNTER — NON-APPOINTMENT (OUTPATIENT)
Age: 84
End: 2019-07-23

## 2019-07-23 ENCOUNTER — APPOINTMENT (OUTPATIENT)
Dept: CARDIOLOGY | Facility: CLINIC | Age: 84
End: 2019-07-23
Payer: MEDICARE

## 2019-07-23 VITALS
SYSTOLIC BLOOD PRESSURE: 137 MMHG | WEIGHT: 120 LBS | DIASTOLIC BLOOD PRESSURE: 70 MMHG | HEART RATE: 67 BPM | HEIGHT: 62 IN | OXYGEN SATURATION: 92 % | BODY MASS INDEX: 22.08 KG/M2

## 2019-07-23 PROCEDURE — 93000 ELECTROCARDIOGRAM COMPLETE: CPT

## 2019-07-23 PROCEDURE — 99215 OFFICE O/P EST HI 40 MIN: CPT

## 2019-07-23 RX ORDER — ATORVASTATIN CALCIUM 40 MG/1
40 TABLET, FILM COATED ORAL
Qty: 90 | Refills: 3 | Status: DISCONTINUED | COMMUNITY
Start: 2019-04-01 | End: 2019-07-23

## 2019-07-23 NOTE — HISTORY OF PRESENT ILLNESS
[FreeTextEntry1] : Here for followup. No new complaints. No chest pain, dyspnea or edema\par 1. CAD- AWMI in 2014 with medical management due to late presentation\par On ASA daily, EKG reviewed and unchanged. Condition is stable. Continue present meds\par 2. Chronic systolic heart failure- last TTE in Feb 2019 with severe LV dysfunction\par On Lisinopril 2.5 mg daily, Toprol 25 mg daily \par No need for diuretic\par Condition is stable. Continue present meds\par 3. HTN- Controlled on above meds, will continue\par 4. HLD- on statin daily, would continue

## 2019-07-23 NOTE — PHYSICAL EXAM
[Normal Appearance] : normal appearance [General Appearance - Well Developed] : well developed [Well Groomed] : well groomed [General Appearance - Well Nourished] : well nourished [General Appearance - In No Acute Distress] : no acute distress [No Deformities] : no deformities [Normal Jugular Venous A Waves Present] : normal jugular venous A waves present [Normal Jugular Venous V Waves Present] : normal jugular venous V waves present [No Jugular Venous Steinberg A Waves] : no jugular venous steinberg A waves [Exaggerated Use Of Accessory Muscles For Inspiration] : no accessory muscle use [Respiration, Rhythm And Depth] : normal respiratory rhythm and effort [Auscultation Breath Sounds / Voice Sounds] : lungs were clear to auscultation bilaterally [Heart Rate And Rhythm] : heart rate and rhythm were normal [Heart Sounds] : normal S1 and S2 [Murmurs] : no murmurs present [Abdomen Tenderness] : non-tender [Abdomen Soft] : soft [Abdomen Mass (___ Cm)] : no abdominal mass palpated [Abnormal Walk] : normal gait [Gait - Sufficient For Exercise Testing] : the gait was sufficient for exercise testing [Nail Clubbing] : no clubbing of the fingernails [Cyanosis, Localized] : no localized cyanosis [Petechial Hemorrhages (___cm)] : no petechial hemorrhages [Skin Color & Pigmentation] : normal skin color and pigmentation [] : no rash [No Venous Stasis] : no venous stasis [Skin Lesions] : no skin lesions [No Skin Ulcers] : no skin ulcer [No Xanthoma] : no  xanthoma was observed [Affect] : the affect was normal [Oriented To Time, Place, And Person] : oriented to person, place, and time [Mood] : the mood was normal [No Anxiety] : not feeling anxious

## 2019-07-23 NOTE — DISCUSSION/SUMMARY
[FreeTextEntry1] : 87 year old woman with known CAD, sp Late presentation AWMI in 2014 without evidence of viability, severe ICM (last tte in Feb 2019) HTN HLD here for followup\par \par 1. CAD- AWMI in 2014 with medical management due to late presentation\par On ASA daily, EKG reviewed and unchanged. Condition is stable. Continue present meds\par 2. Chronic systolic heart failure- last TTE in Feb 2019 with severe LV dysfunction\par On Lisinopril 2.5 mg daily, Toprol 25 mg daily \par No need for diuretic\par Condition is stable. Continue present meds\par 3. HTN- Controlled on above meds, will continue\par 4. HLD- on statin daily, would continue \par 5. FU in 3-6 months

## 2020-01-24 ENCOUNTER — APPOINTMENT (OUTPATIENT)
Dept: CARDIOLOGY | Facility: CLINIC | Age: 85
End: 2020-01-24

## 2020-01-28 ENCOUNTER — APPOINTMENT (OUTPATIENT)
Dept: CARDIOLOGY | Facility: CLINIC | Age: 85
End: 2020-01-28
Payer: MEDICARE

## 2020-01-28 ENCOUNTER — RX RENEWAL (OUTPATIENT)
Age: 85
End: 2020-01-28

## 2020-01-28 ENCOUNTER — NON-APPOINTMENT (OUTPATIENT)
Age: 85
End: 2020-01-28

## 2020-01-28 VITALS
HEART RATE: 71 BPM | DIASTOLIC BLOOD PRESSURE: 70 MMHG | SYSTOLIC BLOOD PRESSURE: 154 MMHG | WEIGHT: 125 LBS | HEIGHT: 62 IN | OXYGEN SATURATION: 95 % | BODY MASS INDEX: 23 KG/M2

## 2020-01-28 PROCEDURE — 93000 ELECTROCARDIOGRAM COMPLETE: CPT

## 2020-01-28 PROCEDURE — 99214 OFFICE O/P EST MOD 30 MIN: CPT

## 2020-01-28 NOTE — PHYSICAL EXAM
[General Appearance - Well Developed] : well developed [Normal Appearance] : normal appearance [Well Groomed] : well groomed [General Appearance - Well Nourished] : well nourished [No Deformities] : no deformities [General Appearance - In No Acute Distress] : no acute distress [Normal Jugular Venous A Waves Present] : normal jugular venous A waves present [Normal Jugular Venous V Waves Present] : normal jugular venous V waves present [No Jugular Venous Steinberg A Waves] : no jugular venous steinberg A waves [Respiration, Rhythm And Depth] : normal respiratory rhythm and effort [Auscultation Breath Sounds / Voice Sounds] : lungs were clear to auscultation bilaterally [Exaggerated Use Of Accessory Muscles For Inspiration] : no accessory muscle use [Heart Rate And Rhythm] : heart rate and rhythm were normal [Heart Sounds] : normal S1 and S2 [Murmurs] : no murmurs present [Abdomen Soft] : soft [Abdomen Tenderness] : non-tender [Abdomen Mass (___ Cm)] : no abdominal mass palpated [Abnormal Walk] : normal gait [Gait - Sufficient For Exercise Testing] : the gait was sufficient for exercise testing [Nail Clubbing] : no clubbing of the fingernails [Cyanosis, Localized] : no localized cyanosis [Petechial Hemorrhages (___cm)] : no petechial hemorrhages [Skin Color & Pigmentation] : normal skin color and pigmentation [] : no rash [No Venous Stasis] : no venous stasis [No Skin Ulcers] : no skin ulcer [Skin Lesions] : no skin lesions [Oriented To Time, Place, And Person] : oriented to person, place, and time [No Xanthoma] : no  xanthoma was observed [Affect] : the affect was normal [Mood] : the mood was normal [No Anxiety] : not feeling anxious

## 2020-01-28 NOTE — DISCUSSION/SUMMARY
[FreeTextEntry1] : 87 year old woman with CAD, medically managed, HTN HLD HFrEF here for followup\par \par 1. CAD- AWMI in 2014; medical mgmt at that time. On ASA, EKG unchanged. Condition is stable. Continue present meds\par 2. Chronic systolic heart failure- TTE in 2019 with severe LVD, on Lisinopril 2.5 mg and Toprol 25 mg daily. Condition is stable. Continue present meds\par 3. HTN- Controlled on above, continue present meds\par 4. HLD- on statin, continue\par 5. FU in 6 months

## 2020-01-28 NOTE — HISTORY OF PRESENT ILLNESS
[FreeTextEntry1] : Here for followup. Doing well without complaints.\par No chest pain, dyspnea or edema\par 1. CAD- AWMI in 2014; medical mgmt at that time. On ASA, EKG unchanged. Condition is stable. Continue present meds\par 2. Chronic systolic heart failure- TTE in 2019 with severe LVD, on Lisinopril 2.5 mg and Toprol 25 mg daily. Condition is stable. Continue present meds\par 3. HTN- Controlled on above, continue present meds\par 4. HLD- on statin, continue

## 2020-03-13 ENCOUNTER — RX RENEWAL (OUTPATIENT)
Age: 85
End: 2020-03-13

## 2020-08-03 ENCOUNTER — APPOINTMENT (OUTPATIENT)
Dept: CARDIOLOGY | Facility: CLINIC | Age: 85
End: 2020-08-03
Payer: MEDICARE

## 2020-08-03 VITALS
DIASTOLIC BLOOD PRESSURE: 71 MMHG | BODY MASS INDEX: 23 KG/M2 | OXYGEN SATURATION: 94 % | TEMPERATURE: 96.8 F | HEIGHT: 62 IN | WEIGHT: 125 LBS | SYSTOLIC BLOOD PRESSURE: 120 MMHG | HEART RATE: 64 BPM

## 2020-08-03 PROCEDURE — 99214 OFFICE O/P EST MOD 30 MIN: CPT

## 2020-08-03 PROCEDURE — 93000 ELECTROCARDIOGRAM COMPLETE: CPT

## 2020-08-03 NOTE — HISTORY OF PRESENT ILLNESS
[FreeTextEntry1] : Here for followup\par Accompanied by her son. No chest pain, dyspnea or edema\par 1. CAD- AWMI in 2014, medical management at that time, On ASA. Condition is stable. Continue present meds\par 2. Chronic systolic heart failure- TTE 2019 with severe LV dysfunction, on Lisinopril 2.5 mg daily, Toprol 25 mg daily. Condition is stable. Continue present meds\par 3. HTN- Controlled on above, continue present meds\par 4. HLD- On statin, continue present meds

## 2020-08-03 NOTE — DISCUSSION/SUMMARY
[FreeTextEntry1] : 88 year old woman with CAD, MI in 2014, Chronic systolic heart failure, HTN HLD here for followup.\par \par 1. CAD- AWMI in 2014, medical management at that time, On ASA. Condition is stable. Continue present meds\par 2. Chronic systolic heart failure- TTE 2019 with severe LV dysfunction, on Lisinopril 2.5 mg daily, Toprol 25 mg daily. Condition is stable. Continue present meds\par 3. HTN- Controlled on above, continue present meds\par 4. HLD- On statin, continue present meds\par 5. FU in 6 months

## 2020-08-03 NOTE — PHYSICAL EXAM
[Well Groomed] : well groomed [General Appearance - Well Developed] : well developed [Normal Appearance] : normal appearance [General Appearance - Well Nourished] : well nourished [No Deformities] : no deformities [Normal Jugular Venous A Waves Present] : normal jugular venous A waves present [General Appearance - In No Acute Distress] : no acute distress [No Jugular Venous Steinberg A Waves] : no jugular venous steinberg A waves [Normal Jugular Venous V Waves Present] : normal jugular venous V waves present [Exaggerated Use Of Accessory Muscles For Inspiration] : no accessory muscle use [Auscultation Breath Sounds / Voice Sounds] : lungs were clear to auscultation bilaterally [Respiration, Rhythm And Depth] : normal respiratory rhythm and effort [Murmurs] : no murmurs present [Heart Rate And Rhythm] : heart rate and rhythm were normal [Heart Sounds] : normal S1 and S2 [Abdomen Soft] : soft [Abdomen Mass (___ Cm)] : no abdominal mass palpated [Abdomen Tenderness] : non-tender [Nail Clubbing] : no clubbing of the fingernails [Gait - Sufficient For Exercise Testing] : the gait was sufficient for exercise testing [Abnormal Walk] : normal gait [Cyanosis, Localized] : no localized cyanosis [Petechial Hemorrhages (___cm)] : no petechial hemorrhages [Skin Color & Pigmentation] : normal skin color and pigmentation [] : no rash [No Venous Stasis] : no venous stasis [Skin Lesions] : no skin lesions [No Skin Ulcers] : no skin ulcer [No Xanthoma] : no  xanthoma was observed [Oriented To Time, Place, And Person] : oriented to person, place, and time [Affect] : the affect was normal [No Anxiety] : not feeling anxious [Mood] : the mood was normal

## 2020-10-13 ENCOUNTER — APPOINTMENT (OUTPATIENT)
Dept: CARDIOLOGY | Facility: CLINIC | Age: 85
End: 2020-10-13
Payer: MEDICARE

## 2020-10-13 ENCOUNTER — NON-APPOINTMENT (OUTPATIENT)
Age: 85
End: 2020-10-13

## 2020-10-13 VITALS
DIASTOLIC BLOOD PRESSURE: 79 MMHG | HEIGHT: 62 IN | SYSTOLIC BLOOD PRESSURE: 145 MMHG | OXYGEN SATURATION: 96 % | TEMPERATURE: 96.6 F | HEART RATE: 68 BPM | BODY MASS INDEX: 22.5 KG/M2

## 2020-10-13 VITALS — WEIGHT: 123 LBS | BODY MASS INDEX: 22.5 KG/M2

## 2020-10-13 PROCEDURE — 99214 OFFICE O/P EST MOD 30 MIN: CPT

## 2020-10-13 PROCEDURE — 93000 ELECTROCARDIOGRAM COMPLETE: CPT

## 2020-10-13 NOTE — PHYSICAL EXAM
[General Appearance - Well Developed] : well developed [Normal Appearance] : normal appearance [Well Groomed] : well groomed [General Appearance - Well Nourished] : well nourished [No Deformities] : no deformities [General Appearance - In No Acute Distress] : no acute distress [Normal Jugular Venous A Waves Present] : normal jugular venous A waves present [Normal Jugular Venous V Waves Present] : normal jugular venous V waves present [No Jugular Venous Steinberg A Waves] : no jugular venous steinberg A waves [Respiration, Rhythm And Depth] : normal respiratory rhythm and effort [Exaggerated Use Of Accessory Muscles For Inspiration] : no accessory muscle use [Auscultation Breath Sounds / Voice Sounds] : lungs were clear to auscultation bilaterally [Heart Rate And Rhythm] : heart rate and rhythm were normal [Heart Sounds] : normal S1 and S2 [Murmurs] : no murmurs present [Abdomen Soft] : soft [Abdomen Tenderness] : non-tender [Abdomen Mass (___ Cm)] : no abdominal mass palpated [Abnormal Walk] : normal gait [Gait - Sufficient For Exercise Testing] : the gait was sufficient for exercise testing [Nail Clubbing] : no clubbing of the fingernails [Cyanosis, Localized] : no localized cyanosis [Petechial Hemorrhages (___cm)] : no petechial hemorrhages [Skin Color & Pigmentation] : normal skin color and pigmentation [] : no rash [No Venous Stasis] : no venous stasis [Skin Lesions] : no skin lesions [No Skin Ulcers] : no skin ulcer [No Xanthoma] : no  xanthoma was observed [Oriented To Time, Place, And Person] : oriented to person, place, and time [Affect] : the affect was normal [Mood] : the mood was normal [No Anxiety] : not feeling anxious

## 2020-10-13 NOTE — HISTORY OF PRESENT ILLNESS
[FreeTextEntry1] : Here for followup\par No complaints\par Last seen in August.\par Had 4 bloody noses and BP was elevated; she was transiently on Lisinopril 5 mg but she decreased to 2.5 mg again about 5 days ago.\par #CAD- AWMI 2014 with medical management. On ASA. Condition is stable. Continue present meds\par EKG unchanged\par #Chronic systolic heart failure- severe LVD, on Toprol 25 mg dailt and Lisinopril 2.5 mg daily. Continue present meds\par #HTN- Controlled on Toprol and Lisinopril, continue present meds\par #HLD- On Statin therapy, continue present meds

## 2020-10-13 NOTE — DISCUSSION/SUMMARY
[FreeTextEntry1] : 88 year old woman with CAD (medically managed from MI in 2014), HTN HLD HFrEF here for followup\par \par #CAD- AWMI 2014 with medical management. On ASA. Condition is stable. Continue present meds\par EKG unchanged\par #Chronic systolic heart failure- severe LVD, on Toprol 25 mg dailt and Lisinopril 2.5 mg daily. Continue present meds\par #HTN- Controlled on Toprol and Lisinopril, continue present meds\par #HLD- On Statin therapy, continue present meds\par # FU in 6 months

## 2021-04-11 NOTE — PHYSICAL EXAM
[General Appearance - Well Developed] : well developed [Normal Appearance] : normal appearance [Well Groomed] : well groomed [General Appearance - Well Nourished] : well nourished [No Deformities] : no deformities [General Appearance - In No Acute Distress] : no acute distress [Normal Jugular Venous A Waves Present] : normal jugular venous A waves present [Normal Jugular Venous V Waves Present] : normal jugular venous V waves present [No Jugular Venous Steinberg A Waves] : no jugular venous steinberg A waves [Respiration, Rhythm And Depth] : normal respiratory rhythm and effort [Exaggerated Use Of Accessory Muscles For Inspiration] : no accessory muscle use [Auscultation Breath Sounds / Voice Sounds] : lungs were clear to auscultation bilaterally [Heart Rate And Rhythm] : heart rate and rhythm were normal [Murmurs] : no murmurs present [Heart Sounds] : normal S1 and S2 [Abdomen Soft] : soft [Abdomen Tenderness] : non-tender [Abdomen Mass (___ Cm)] : no abdominal mass palpated [Abnormal Walk] : normal gait [Gait - Sufficient For Exercise Testing] : the gait was sufficient for exercise testing [Nail Clubbing] : no clubbing of the fingernails [Cyanosis, Localized] : no localized cyanosis [Petechial Hemorrhages (___cm)] : no petechial hemorrhages [Skin Color & Pigmentation] : normal skin color and pigmentation [] : no rash [No Venous Stasis] : no venous stasis [Skin Lesions] : no skin lesions [No Skin Ulcers] : no skin ulcer [No Xanthoma] : no  xanthoma was observed [Affect] : the affect was normal [Oriented To Time, Place, And Person] : oriented to person, place, and time [Mood] : the mood was normal [No Anxiety] : not feeling anxious

## 2021-04-13 ENCOUNTER — APPOINTMENT (OUTPATIENT)
Dept: CARDIOLOGY | Facility: CLINIC | Age: 86
End: 2021-04-13
Payer: MEDICARE

## 2021-04-13 ENCOUNTER — NON-APPOINTMENT (OUTPATIENT)
Age: 86
End: 2021-04-13

## 2021-04-13 VITALS
OXYGEN SATURATION: 95 % | DIASTOLIC BLOOD PRESSURE: 73 MMHG | RESPIRATION RATE: 16 BRPM | TEMPERATURE: 97.3 F | WEIGHT: 120 LBS | HEART RATE: 65 BPM | HEIGHT: 61 IN | BODY MASS INDEX: 22.66 KG/M2 | SYSTOLIC BLOOD PRESSURE: 150 MMHG

## 2021-04-13 PROCEDURE — 99214 OFFICE O/P EST MOD 30 MIN: CPT

## 2021-04-13 PROCEDURE — 93000 ELECTROCARDIOGRAM COMPLETE: CPT

## 2021-04-13 NOTE — HISTORY OF PRESENT ILLNESS
[FreeTextEntry1] : Here for followup\par No complaints\par Last seen in October \par #CAD- AWMI 2014 with medical management. On ASA. Condition is stable. Continue present meds\par EKG unchanged\par #Chronic systolic heart failure- severe LVD, on Toprol 25 mg dailt and Lisinopril 2.5 mg daily. Continue present meds\par #HTN- Controlled on Toprol and Lisinopril, continue present meds\par #HLD- On Statin therapy, continue present meds

## 2021-04-13 NOTE — DISCUSSION/SUMMARY
[FreeTextEntry1] : 88 year old woman with CAD (medically managed from MI in 2014), HTN HLD HFrEF here for followup\par #CAD- AWMI 2014 with medical management. On ASA. Condition is stable. Continue present meds\par EKG unchanged\par #Chronic systolic heart failure- severe LVD, on Toprol 25 mg dailt and Lisinopril 2.5 mg daily. Continue present meds\par #HTN- Controlled on Toprol and Lisinopril, continue present meds\par #HLD- On Statin therapy, continue present meds\par # FU in 6 months

## 2021-10-15 ENCOUNTER — NON-APPOINTMENT (OUTPATIENT)
Age: 86
End: 2021-10-15

## 2021-10-15 ENCOUNTER — APPOINTMENT (OUTPATIENT)
Dept: CARDIOLOGY | Facility: CLINIC | Age: 86
End: 2021-10-15
Payer: MEDICARE

## 2021-10-15 VITALS
OXYGEN SATURATION: 95 % | HEIGHT: 61 IN | DIASTOLIC BLOOD PRESSURE: 70 MMHG | BODY MASS INDEX: 23.41 KG/M2 | WEIGHT: 124 LBS | SYSTOLIC BLOOD PRESSURE: 144 MMHG | HEART RATE: 54 BPM

## 2021-10-15 DIAGNOSIS — E78.5 HYPERLIPIDEMIA, UNSPECIFIED: ICD-10-CM

## 2021-10-15 PROCEDURE — 93000 ELECTROCARDIOGRAM COMPLETE: CPT

## 2021-10-15 PROCEDURE — 99214 OFFICE O/P EST MOD 30 MIN: CPT

## 2021-10-15 NOTE — HISTORY OF PRESENT ILLNESS
[FreeTextEntry1] : Here for followup\par No complaints\par #CAD- AWMI 2014 with medical management. On ASA. Condition is stable. Continue present meds\par EKG unchanged\par #Chronic systolic heart failure- severe LVD, on Toprol 25 mg daily and Lisinopril 2.5 mg every other day. Continue present meds\par #HTN- Controlled on Toprol and Lisinopril, continue present meds\par #HLD- On Statin therapy, continue present meds

## 2021-10-15 NOTE — DISCUSSION/SUMMARY
[FreeTextEntry1] : 88 year old woman with CAD (medically managed from MI in 2014), HTN HLD HFrEF here for followup\par #CAD- AWMI 2014 with medical management. On ASA. Condition is stable. Continue present meds\par EKG unchanged\par #Chronic systolic heart failure- severe LVD, on Toprol 25 mg daily and Lisinopril 2.5 mg every other day. Continue present meds\par #HTN- Controlled on Toprol and Lisinopril, continue present meds\par #HLD- On Statin therapy, continue present meds\par # FU in 6 months

## 2021-11-15 ENCOUNTER — RESULT REVIEW (OUTPATIENT)
Age: 86
End: 2021-11-15

## 2022-01-01 ENCOUNTER — RX RENEWAL (OUTPATIENT)
Age: 87
End: 2022-01-01

## 2022-01-15 ENCOUNTER — EMERGENCY (EMERGENCY)
Facility: HOSPITAL | Age: 87
LOS: 1 days | Discharge: HOME | End: 2022-01-15
Attending: EMERGENCY MEDICINE | Admitting: STUDENT IN AN ORGANIZED HEALTH CARE EDUCATION/TRAINING PROGRAM
Payer: MEDICARE

## 2022-01-15 VITALS
DIASTOLIC BLOOD PRESSURE: 73 MMHG | RESPIRATION RATE: 20 BRPM | HEIGHT: 61.02 IN | TEMPERATURE: 98 F | SYSTOLIC BLOOD PRESSURE: 140 MMHG | HEART RATE: 68 BPM | OXYGEN SATURATION: 98 %

## 2022-01-15 DIAGNOSIS — Z43.4 ENCOUNTER FOR ATTENTION TO OTHER ARTIFICIAL OPENINGS OF DIGESTIVE TRACT: Chronic | ICD-10-CM

## 2022-01-15 LAB
ALBUMIN SERPL ELPH-MCNC: 4.5 G/DL — SIGNIFICANT CHANGE UP (ref 3.3–5)
ALP SERPL-CCNC: 58 U/L — SIGNIFICANT CHANGE UP (ref 40–120)
ALT FLD-CCNC: 21 U/L — SIGNIFICANT CHANGE UP (ref 4–33)
ANION GAP SERPL CALC-SCNC: 10 MMOL/L — SIGNIFICANT CHANGE UP (ref 7–14)
APTT BLD: 33 SEC — SIGNIFICANT CHANGE UP (ref 27–36.3)
AST SERPL-CCNC: 26 U/L — SIGNIFICANT CHANGE UP (ref 4–32)
BASOPHILS # BLD AUTO: 0.03 K/UL — SIGNIFICANT CHANGE UP (ref 0–0.2)
BASOPHILS NFR BLD AUTO: 0.4 % — SIGNIFICANT CHANGE UP (ref 0–2)
BILIRUB SERPL-MCNC: 0.4 MG/DL — SIGNIFICANT CHANGE UP (ref 0.2–1.2)
BUN SERPL-MCNC: 22 MG/DL — SIGNIFICANT CHANGE UP (ref 7–23)
CALCIUM SERPL-MCNC: 9.8 MG/DL — SIGNIFICANT CHANGE UP (ref 8.4–10.5)
CHLORIDE SERPL-SCNC: 103 MMOL/L — SIGNIFICANT CHANGE UP (ref 98–107)
CK MB CFR SERPL CALC: 3.1 NG/ML — SIGNIFICANT CHANGE UP
CO2 SERPL-SCNC: 27 MMOL/L — SIGNIFICANT CHANGE UP (ref 22–31)
CREAT SERPL-MCNC: 0.95 MG/DL — SIGNIFICANT CHANGE UP (ref 0.5–1.3)
EOSINOPHIL # BLD AUTO: 0.07 K/UL — SIGNIFICANT CHANGE UP (ref 0–0.5)
EOSINOPHIL NFR BLD AUTO: 0.9 % — SIGNIFICANT CHANGE UP (ref 0–6)
GLUCOSE SERPL-MCNC: 96 MG/DL — SIGNIFICANT CHANGE UP (ref 70–99)
HCT VFR BLD CALC: 43.3 % — SIGNIFICANT CHANGE UP (ref 34.5–45)
HGB BLD-MCNC: 14.1 G/DL — SIGNIFICANT CHANGE UP (ref 11.5–15.5)
IANC: 5.62 K/UL — SIGNIFICANT CHANGE UP (ref 1.5–8.5)
IMM GRANULOCYTES NFR BLD AUTO: 0.3 % — SIGNIFICANT CHANGE UP (ref 0–1.5)
INR BLD: 1.02 RATIO — SIGNIFICANT CHANGE UP (ref 0.88–1.16)
LYMPHOCYTES # BLD AUTO: 1.26 K/UL — SIGNIFICANT CHANGE UP (ref 1–3.3)
LYMPHOCYTES # BLD AUTO: 16.2 % — SIGNIFICANT CHANGE UP (ref 13–44)
MCHC RBC-ENTMCNC: 30.8 PG — SIGNIFICANT CHANGE UP (ref 27–34)
MCHC RBC-ENTMCNC: 32.6 GM/DL — SIGNIFICANT CHANGE UP (ref 32–36)
MCV RBC AUTO: 94.5 FL — SIGNIFICANT CHANGE UP (ref 80–100)
MONOCYTES # BLD AUTO: 0.79 K/UL — SIGNIFICANT CHANGE UP (ref 0–0.9)
MONOCYTES NFR BLD AUTO: 10.1 % — SIGNIFICANT CHANGE UP (ref 2–14)
NEUTROPHILS # BLD AUTO: 5.62 K/UL — SIGNIFICANT CHANGE UP (ref 1.8–7.4)
NEUTROPHILS NFR BLD AUTO: 72.1 % — SIGNIFICANT CHANGE UP (ref 43–77)
NRBC # BLD: 0 /100 WBCS — SIGNIFICANT CHANGE UP
NRBC # FLD: 0 K/UL — SIGNIFICANT CHANGE UP
PLATELET # BLD AUTO: 169 K/UL — SIGNIFICANT CHANGE UP (ref 150–400)
POTASSIUM SERPL-MCNC: 4.5 MMOL/L — SIGNIFICANT CHANGE UP (ref 3.5–5.3)
POTASSIUM SERPL-SCNC: 4.5 MMOL/L — SIGNIFICANT CHANGE UP (ref 3.5–5.3)
PROT SERPL-MCNC: 7.2 G/DL — SIGNIFICANT CHANGE UP (ref 6–8.3)
PROTHROM AB SERPL-ACNC: 11.6 SEC — SIGNIFICANT CHANGE UP (ref 10.6–13.6)
RBC # BLD: 4.58 M/UL — SIGNIFICANT CHANGE UP (ref 3.8–5.2)
RBC # FLD: 13.4 % — SIGNIFICANT CHANGE UP (ref 10.3–14.5)
SARS-COV-2 RNA SPEC QL NAA+PROBE: SIGNIFICANT CHANGE UP
SODIUM SERPL-SCNC: 140 MMOL/L — SIGNIFICANT CHANGE UP (ref 135–145)
TROPONIN T, HIGH SENSITIVITY RESULT: 14 NG/L — SIGNIFICANT CHANGE UP
TROPONIN T, HIGH SENSITIVITY RESULT: 15 NG/L — SIGNIFICANT CHANGE UP
WBC # BLD: 7.79 K/UL — SIGNIFICANT CHANGE UP (ref 3.8–10.5)
WBC # FLD AUTO: 7.79 K/UL — SIGNIFICANT CHANGE UP (ref 3.8–10.5)

## 2022-01-15 PROCEDURE — 99220: CPT | Mod: FS,25

## 2022-01-15 PROCEDURE — 93010 ELECTROCARDIOGRAM REPORT: CPT

## 2022-01-15 PROCEDURE — 71045 X-RAY EXAM CHEST 1 VIEW: CPT | Mod: 26

## 2022-01-15 PROCEDURE — 70498 CT ANGIOGRAPHY NECK: CPT | Mod: 26,MA

## 2022-01-15 PROCEDURE — 70496 CT ANGIOGRAPHY HEAD: CPT | Mod: 26,MA

## 2022-01-15 RX ORDER — ASPIRIN/CALCIUM CARB/MAGNESIUM 324 MG
81 TABLET ORAL DAILY
Refills: 0 | Status: DISCONTINUED | OUTPATIENT
Start: 2022-01-15 | End: 2022-01-19

## 2022-01-15 RX ORDER — LISINOPRIL 2.5 MG/1
2.5 TABLET ORAL AT BEDTIME
Refills: 0 | Status: DISCONTINUED | OUTPATIENT
Start: 2022-01-15 | End: 2022-01-19

## 2022-01-15 RX ORDER — CLOPIDOGREL BISULFATE 75 MG/1
75 TABLET, FILM COATED ORAL ONCE
Refills: 0 | Status: COMPLETED | OUTPATIENT
Start: 2022-01-15 | End: 2022-01-15

## 2022-01-15 RX ORDER — ATORVASTATIN CALCIUM 80 MG/1
80 TABLET, FILM COATED ORAL AT BEDTIME
Refills: 0 | Status: DISCONTINUED | OUTPATIENT
Start: 2022-01-15 | End: 2022-01-19

## 2022-01-15 RX ORDER — CLOPIDOGREL BISULFATE 75 MG/1
75 TABLET, FILM COATED ORAL DAILY
Refills: 0 | Status: DISCONTINUED | OUTPATIENT
Start: 2022-01-16 | End: 2022-01-19

## 2022-01-15 RX ORDER — METOPROLOL TARTRATE 50 MG
25 TABLET ORAL ONCE
Refills: 0 | Status: COMPLETED | OUTPATIENT
Start: 2022-01-16 | End: 2022-01-16

## 2022-01-15 RX ORDER — METOPROLOL TARTRATE 50 MG
25 TABLET ORAL DAILY
Refills: 0 | Status: DISCONTINUED | OUTPATIENT
Start: 2022-01-15 | End: 2022-01-15

## 2022-01-15 RX ADMIN — ATORVASTATIN CALCIUM 80 MILLIGRAM(S): 80 TABLET, FILM COATED ORAL at 22:40

## 2022-01-15 RX ADMIN — CLOPIDOGREL BISULFATE 75 MILLIGRAM(S): 75 TABLET, FILM COATED ORAL at 22:40

## 2022-01-15 RX ADMIN — LISINOPRIL 2.5 MILLIGRAM(S): 2.5 TABLET ORAL at 22:40

## 2022-01-15 NOTE — ED CDU PROVIDER INITIAL DAY NOTE - CROS ED CARDIOVAS ALL NEG
Ramon Turner RN, BSN, OCN   Oncology Care Coordinator [facility]  [phone]  12/26/2018, 4:25 PM         negative...

## 2022-01-15 NOTE — ED CDU PROVIDER INITIAL DAY NOTE - PHYSICAL EXAMINATION
Neuro: A&Ox3, PERRL, slight right sided facial droop, equal eyebrow raise, strength 5/5 in all extremities, sensation intact and equal b/l, normal finger to nose, no pronator drift

## 2022-01-15 NOTE — ED CDU PROVIDER INITIAL DAY NOTE - OBJECTIVE STATEMENT
89yF w/pmhx CAD s/p MI (no stents), HTN, HLD, parkinson's?, diverticulosis, duodenal ulcers, chronic systolic heart failure with EF of 35% presented to the ED with right eye blurry vision, right facial droop and left sided paraesthesias. Last known well at 5pm. Pt was a code stroke. Pt reports since this morning she noticed her vision was foggy in the right eye. Pt reports paresthesias in the left leg as well as dizziness, son at bedside states these are chronic complaints. Pt denies difficulty speaking or swallowing, headache, eye pain, URI symptoms, cp, sob, abd pain, n/v/d, recent travel or illness or any other concerns.  In main ED CT brain w/perfusion, CTa head/neck without acute abnormality, labs within normal, EKG non ischemic, trop 17, ED team stained right eye no evidence of corneal abrasion  Sent to CDU for MR brain, echo, tele monitoring, neuro following, pending A1c/lipid panel/rpt trop  Optho in morning vs outpt follow up if [place] w/ difficulty keeping right eye closed 89yF w/pmhx CAD s/p MI (no stents), HTN, HLD, parkinson's?, diverticulosis, duodenal ulcers, chronic systolic heart failure with EF of 35% presented to the ED with right eye blurry vision, right facial droop and left sided paraesthesias. Last known well at 5pm. Pt was a code stroke. Pt reports since this morning she noticed her vision was foggy in the right eye. Pt reports paresthesias in the left leg as well as dizziness, son at bedside states these are chronic complaints. Pt denies difficulty speaking or swallowing, headache, eye pain, URI symptoms, cp, sob, abd pain, n/v/d, recent travel or illness or any other concerns. Pt is [place] speaking, son Jude provided translation  In main ED CT brain w/perfusion, CTa head/neck without acute abnormality, labs within normal, EKG non ischemic, trop 17, ED team stained right eye no evidence of corneal abrasion  Sent to CDU for MR brain, echo, tele monitoring, neuro following, pending A1c/lipid panel/rpt trop  Optho in morning vs outpt follow up if [place] w/ difficulty keeping right eye closed

## 2022-01-15 NOTE — ED PROVIDER NOTE - NS ED ROS FT
CONSTITUTIONAL: No fevers, chills, fatigue, dizziness, weakness  EYES: + right eye blurry vision. No double vision  ENT: No ear pain, nasal congestion, runny nose, sore throat  CV: No chest pain, palpitations  PULM: No cough, shortness of breath  GI: No abdominal pain, nausea, vomiting, diarrhea, constipation  : No dysuria, polyuria, hematuria  SKIN: No rashes, swelling  MSK: No muscle aches  NEURO: + left sided numbness, right facial droop. No headache  PSYCHIATRIC: No auditory, visual, tactile hallucinations

## 2022-01-15 NOTE — ED PROVIDER NOTE - NSICDXPASTMEDICALHX_GEN_ALL_CORE_FT
PAST MEDICAL HISTORY:  Congestive heart failure ~ EF = 30 to 35% (06/26/2014)    Coronary artery disease s/p late presentation AWSTEMI  Proximal LAD: The distal vessel was supplied by collaterals from  the right posterior descending. There was a 100 % stenosis. There was SHIELA  grade 0 flow through the vessel (no flow).    Diverticulosis     GERD (gastroesophageal reflux disease)     Glaucoma     Hyperlipidemia     Hypertension     Parkinson disease

## 2022-01-15 NOTE — ED PROVIDER NOTE - PHYSICAL EXAMINATION
GENERAL: elderly female, lying in bed, NAD. Vital signs are within normal limits  EYES: Conjunctiva noninjected or pale, sclera anicteric  HENT: NC/AT, moist mucous membranes  NECK: Supple, trachea midline  LUNG: Nonlabored respirations, no wheezes, rales  CV: RRR, Pulses- Radial/dorsalis pedis: 2+ bilateral and equal  ABDOMEN: Nondistended, nontender  MSK: No visible deformities, nontender extremities  SKIN: No rashes, bruises  NEURO: AAOx4 (to person, place, time, event), no tremor, no truncal ataxia, no pronator drift, sensation intact to touch, finger to nose smooth and rapid  -Cranial Nerves:  --CN II: PERRL 3mm  --CN III, IV, VI: EOMI bilateral no nystagmus  --CN V1-3: Facial sensation intact to touch  --CN VII: + right facial droop though right forehead spared  --CN VIII: Hearing intact to rubbing fingers b/l  --CN IX, X: Palate elevates symmetrically. Normal phonation  --CN XI: Heading turning and shoulder shrug intact b/l  --CN XII: Tongue midline with normal movements   PSYCH: Normal mood and affect GENERAL: elderly female, lying in bed, NAD. Vital signs are within normal limits  EYES: Conjunctiva noninjected or pale, sclera anicteric, no periorbital ecchymosis, no tenderness to palpation of orbital rim, lid margins clear, no retained foreign body on lid eversion, no corneal defect, no corneal abrasions/ulcerations, neg siedels,   HENT: NC/AT, moist mucous membranes  NECK: Supple, trachea midline  LUNG: Nonlabored respirations, no wheezes, rales  CV: RRR, Pulses- Radial/dorsalis pedis: 2+ bilateral and equal  ABDOMEN: Nondistended, nontender  MSK: No visible deformities, nontender extremities  SKIN: No rashes, bruises  NEURO: AAOx4 (to person, place, time, event), no tremor, no truncal ataxia, no pronator drift, sensation intact to touch, finger to nose smooth and rapid  -Cranial Nerves:  --CN II: PERRL 3mm  --CN III, IV, VI: EOMI bilateral no nystagmus  --CN V1-3: Facial sensation intact to touch  --CN VII: + right facial droop though right forehead spared  --CN VIII: Hearing intact to rubbing fingers b/l  --CN IX, X: Palate elevates symmetrically. Normal phonation  --CN XI: Heading turning and shoulder shrug intact b/l  --CN XII: Tongue midline with normal movements   PSYCH: Normal mood and affect

## 2022-01-15 NOTE — CONSULT NOTE ADULT - ASSESSMENT
86F R handed Bulgarian speaking w/ CAD [no stents, s/p AWSTEMI 2014], CHF EF: 35%, HTN, HLD, parkinson's? (not on medications), diverticulosis, glaucoma s/p L eye surgery (w/ residual L eye ptosis and blindness) presents with acute onset R eye blurry vision and R facial droop. LKN: 1/14/22 at 17:00 when son at bedside saw her. Pt reports R eye blurry vision that started around 9pm last night, now resolved. Pt's son noticed when he saw her at 2pm on 1/15/22 and noticed a R facial droop and that her speech was slightly slurred but answering questions appropriately w/ no word finding difficulty. No previous hx of stroke. On aspirin 81mg 3x/week, no anticoagulation. At baseline per son, pt has no significant memory issues, ambulates occasionally with cane, does all ADLs independently (feeding, showering). NIHSS: 1, preMRS: 1. 86F R handed Kinyarwanda speaking w/ CAD [no stents, s/p AWSTEMI 2014], CHF EF: 35%, HTN, HLD, parkinson's? (not on medications), diverticulosis, glaucoma s/p L eye surgery (w/ residual L eye ptosis and blindness) presents with acute onset R eye blurry vision and R facial droop. LKN: 1/14/22 at 17:00 when son at bedside saw her. Pt reports R eye blurry vision that started around 9pm last night, now resolved. Pt denied any R eye ptosis or diplopia. Pt's son noticed when he saw her at 2pm on 1/15/22 and noticed a R facial droop and that her speech was slightly slurred but answering questions appropriately w/ no word finding difficulty. No previous hx of stroke. On aspirin 81mg 3x/week, no anticoagulation. At baseline per son, pt has no significant memory issues, ambulates occasionally with cane, does all ADLs independently (feeding, showering). For her Parkinson's, son is not sure when she was diagnosed and pt does not know if it started unilaterally. Per son, pt had seen a doctor for it about 4-5 years ago once and was not started on a medication for her tremor. Neuro exam notable for R facial droop, mild dysarthria, L hemiparesis 4+/5 on initial exam then 5/5 throughout on subsequent exam, resting/postural/action tremor b/l UE. NIHSS: 2, preMRS: 1. CTH neg, CTA w/ mild calcification of L carotid bifurcation    Impression: R facial droop (forehead sparing), possible L hemiparesis unclear etiology, possibly 2/2 TIA/minor stroke (pt w/ several risk factors), less likely Bell's palsy    Recommendations:  #R facial droop, dysarthria  [] Keep BP permissive up to 220/110  [] MRI brain without contrast   [] TTE, can be done inpatient vs. outpatient  [] monitor on telemetry  [] ILR to assess for afib per STROKE-AF trial if MRI w/ evidence of stroke, can be done inpatient vs. outpatient  [] start ASA 81 mg PO daily   [] start plavix 75mg PO daily; duration to be determined; can stop if MRI negative for stroke  [] start atorvastatin 80mg PO daily (titrate to LDL < 70)   [] stroke risk factor modification and counseling   [] check HA1c, lipid panel  [] NPO until bedside S&S  [] Patient can follow up with Dr. Armando Nuñez after discharge. Please instruct the patient to call [phone] to schedule an appointment. Office is located at [address] [place] [address].    #Parkinson's  [] pt on no home medications  [] outpatient neurology f/u with movement disorders specialist Dr. Jose Murillo at [address] [place] [address].    Case discussed with telestroke attending Dr. Armando Nuñez  Case to be seen and discussed with attending Dr. Emmanuel in AM       86F R handed Welsh speaking w/ CAD [no stents, s/p AWSTEMI 2014], CHF EF: 35%, HTN, HLD, parkinson's? (not on medications), diverticulosis, glaucoma s/p L eye surgery (w/ residual L eye ptosis and blindness) presents with acute onset R eye blurry vision and R facial droop. LKN: 1/14/22 at 17:00 when son at bedside saw her. Pt reports R eye blurry vision that started around 9pm last night, now resolved. Pt denied any R eye ptosis or diplopia. Pt's son noticed when he saw her at 2pm on 1/15/22 and noticed a R facial droop and that her speech was slightly slurred but answering questions appropriately w/ no word finding difficulty. No previous hx of stroke. On aspirin 81mg 3x/week, no anticoagulation. At baseline per son, pt has no significant memory issues, ambulates occasionally with cane, does all ADLs independently (feeding, showering). For her Parkinson's, son is not sure when she was diagnosed and pt does not know if it started unilaterally. Per son, pt had seen a doctor for it about 4-5 years ago once and was not started on a medication for her tremor. . Neuro exam notable for R facial droop, mild dysarthria, L hemiparesis 4+/5 on initial exam then 5/5 throughout on subsequent exam, resting/postural/action tremor b/l UE. NIHSS: 2, preMRS: 1. CTH neg, CTA w/ mild calcification of L carotid bifurcation. No tpa, no MT.    Impression: R facial droop (forehead sparing), possible L hemiparesis unclear etiology, possibly 2/2 TIA/minor stroke (pt w/ several risk factors), less likely Bell's palsy    Recommendations:  #R facial droop, dysarthria  [] MRI brain without contrast   [] TTE, can be done inpatient vs. outpatient  [] monitor on telemetry  [] ILR to assess for afib per STROKE-AF trial if MRI w/ evidence of stroke, can be done inpatient vs. outpatient  [] start ASA 81 mg PO daily   [] start plavix 75mg PO daily; duration to be determined; can stop if MRI negative for stroke  [] start atorvastatin 80mg PO daily (titrate to LDL < 70)   [] stroke risk factor modification and counseling   [] check HA1c, lipid panel  [] NPO until bedside S&S  [] Patient can follow up with Dr. Armando Nuñez after discharge. Please instruct the patient to call [phone] to schedule an appointment. Office is located at [address] [place] [address].    #Parkinson's  [] pt on no home medications  [] outpatient neurology f/u with movement disorders specialist Dr. Jose Murillo at [address] [place] [address].    Case discussed with telestroke attending Dr. Armando Nuñez  Case to be seen and discussed with attending Dr. Emmanuel in AM       89F R handed Telugu speaking w/ CAD [no stents, s/p AWSTEMI 2014], CHF EF: 35%, HTN, HLD, parkinson's? (not on medications), diverticulosis, glaucoma s/p L eye surgery (w/ residual L eye ptosis and blindness) presents with acute onset R eye blurry vision and R facial droop. LKN: 1/14/22 at 17:00 when son at bedside saw her. Pt reports R eye blurry vision that started around 9pm last night, now resolved. Pt denied any R eye ptosis or diplopia. Pt's son noticed when he saw her at 2pm on 1/15/22 and noticed a R facial droop and that her speech was slightly slurred but answering questions appropriately w/ no word finding difficulty. No previous hx of stroke. On aspirin 81mg 3x/week, no anticoagulation. At baseline per son, pt has no significant memory issues, ambulates occasionally with cane, does all ADLs independently (feeding, showering). For her Parkinson's, son is not sure when she was diagnosed and pt does not know if it started unilaterally. Per son, pt had seen a doctor for it about 4-5 years ago once and was not started on a medication for her tremor. . Neuro exam notable for R facial droop, mild dysarthria, L hemiparesis 4+/5 on initial exam then 5/5 throughout on subsequent exam, resting/postural/action tremor b/l UE. NIHSS: 2, preMRS: 1. CTH neg, CTA w/ mild calcification of L carotid bifurcation. No tpa, no MT.    Impression: R facial droop (forehead sparing), possible L hemiparesis unclear etiology, possibly 2/2 TIA/minor stroke (pt w/ several risk factors), less likely Bell's palsy    Recommendations:  #R facial droop, dysarthria  [] MRI brain without contrast   [] TTE, can be done inpatient vs. outpatient  [] monitor on telemetry  [] ILR to assess for afib per STROKE-AF trial if MRI w/ evidence of stroke, can be done inpatient vs. outpatient  [] start ASA 81 mg PO daily   [] start plavix 75mg PO daily; duration to be determined; can stop if MRI negative for stroke  [] start atorvastatin 80mg PO daily (titrate to LDL < 70)   [] stroke risk factor modification and counseling   [] check HA1c, lipid panel  [] NPO until bedside S&S  [] Patient can follow up with Dr. Armando Nuñez after discharge. Please instruct the patient to call [phone] to schedule an appointment. Office is located at [address] [place] [address].    #Parkinson's  [] pt on no home medications  [] outpatient neurology f/u with movement disorders specialist Dr. Jose Murillo at [address] [place] [address].    Case discussed with telestroke attending Dr. Armando Nuñez  Case to be seen and discussed with attending Dr. Emmanuel in AM       89F R handed Welsh speaking w/ CAD [no stents, s/p AWSTEMI 2014], CHF EF: 35%, HTN, HLD, parkinson's? (not on medications), diverticulosis, glaucoma s/p L eye surgery (w/ residual L eye ptosis and blindness) presents with acute onset R eye blurry vision and R facial droop. LKN: 1/14/22 at 17:00 when son at bedside saw her. Pt reports R eye blurry vision that started around 9pm last night, now resolved. Pt denied any R eye ptosis or diplopia. Pt's son noticed when he saw her at 2pm on 1/15/22 and noticed a R facial droop and that her speech was slightly slurred but answering questions appropriately w/ no word finding difficulty. No previous hx of stroke. On aspirin 81mg 3x/week, no anticoagulation. At baseline per son, pt has no significant memory issues, ambulates occasionally with cane, does all ADLs independently (feeding, showering). For her Parkinson's, son is not sure when she was diagnosed and pt does not know if it started unilaterally. Per son, pt had seen a doctor for it about 4-5 years ago once and was not started on a medication for her tremor. . Neuro exam notable for R facial droop, mild dysarthria, L hemiparesis 4+/5 on initial exam then 5/5 throughout on subsequent exam, resting/postural/action tremor b/l UE. NIHSS: 2, preMRS: 1. CTH neg, CTA w/ mild calcification of L carotid bifurcation. No tpa, no MT.    Impression: R facial droop (forehead sparing), possible L hemiparesis unclear etiology, possibly 2/2 TIA/minor stroke (pt w/ several risk factors), less likely Bell's palsy    Recommendations:  #R facial droop, dysarthria  [] MRI brain without contrast   [] TTE, can be done inpatient vs. outpatient  [] monitor on telemetry  [] ILR to assess for afib per STROKE-AF trial if MRI w/ evidence of stroke, can be done inpatient vs. outpatient  [] start ASA 81 mg PO daily   [] start plavix 75mg PO daily; duration to be determined; can stop if MRI negative for stroke  [] start atorvastatin 80mg PO daily (titrate to LDL < 70)   [] stroke risk factor modification and counseling   [] check HA1c, lipid panel  [] NPO until bedside S&S  [] Patient can follow up with Dr. Armando Nuñez after discharge. Please instruct the patient to call [phone] to schedule an appointment. Office is located at [address] [place] [address].    #Parkinson's  [] pt on no home medications  [] outpatient neurology f/u with movement disorders specialist Dr. Jose Murillo at [address] [place] [address].    Case discussed with telestroke attending Dr. Armando Nuñez, to be seen in AM

## 2022-01-15 NOTE — ED ADULT NURSE NOTE - OBJECTIVE STATEMENT
Patient arrives to room 1, A&Ox3, Sinhala speaking, ambulatory at baseline, c/o acute onset R eye blurry vision and R facial droop. LKN: 1/14/22 at 17:00. patient has no CVA hx, take aspirin every other day for cardiac related history. PMH parkinsons. breathing even and unlabored, pallor/diaphoresis not noted. Denies chest pain, shortness of breath, nausea, vomiting or diarrhea. 20G IV placed in L arm. labs sent, patient completed CT scans, pending lab results and radiology results. Patient arrives to room 1, A&Ox3, Tajik speaking, ambulatory at baseline, c/o acute onset R eye blurry vision and R facial droop. LKN: 1/14/22 at 17:00. patient has no CVA hx, take aspirin every other day for cardiac related history. PMH parkinsons. BL  strength equal, no drifts noted, no slurred speech noted. (see NIH scale). breathing even and unlabored, pallor/diaphoresis not noted. Denies chest pain, shortness of breath, nausea, vomiting or diarrhea. 20G IV placed in L arm. labs sent, patient completed CT scans, pending lab results and radiology results.

## 2022-01-15 NOTE — ED CDU PROVIDER INITIAL DAY NOTE - CROS ED NEURO POS
Rash over multiple parts of his body, painful.   Bilateral lower extremity swelling right sided facial droop, right eye blurry vision, left sided paresthesias

## 2022-01-15 NOTE — ED PROVIDER NOTE - CLINICAL SUMMARY MEDICAL DECISION MAKING FREE TEXT BOX
89F R handed Kazakh speaking w/ CAD [no stents. ant wall STEMI 2014], HTN, HLD, parkinson's? (not on medications), diverticulosis duodenal ulcers, CHF EF: 35%, LKN 5pm 1/14 here for concern for CVA with right facial droop, vague, left sided numbness. Vitals wnl. Neurovasc intact, with right facial droop but forehead sparing. Full strenght UE/LE, speech fluent. History and exam concerning for primary neurovascular pathology (ex. stroke, less likely seizure w/ veronica's paralysis, unlikely complex migraine). However, possible atypical bell's palsy. Check labs, cardiac enzymes, CTA head and neck, CXR, EKG, discuss with neurology. Check lyme. 89F R handed Chinese speaking w/ CAD [no stents. ant wall STEMI 2014], HTN, HLD, parkinson's? (not on medications), diverticulosis duodenal ulcers, CHF EF: 35%, LKN 5pm 1/14 here for concern for CVA with right facial droop, vague, left sided numbness. Vitals wnl. Neurovasc intact, with right facial droop but forehead sparing. Full strenght UE/LE, speech fluent. History and exam concerning for primary neurovascular pathology (ex. stroke, less likely seizure w/ veronica's paralysis, unlikely complex migraine). However, possible atypical bell's palsy. Check labs, cardiac enzymes, CTA head and neck, CXR, EKG, discuss with neurology. Check lyme.    flynn: 89 yo woman hx left eye blindness (glaucoma) with episode today of right sided facial droop, last seen normal last night,  pt here with droop on right sided, can moved above the eyebrows (wrinkles) good  bilaterally, good leg strength bilaterally.  son present and says her language is appropriate in Georgian.  pt with fluent language.  no hx lyme, no hx bells.  denies PEREZ, neuro involved for stroke code.  plan mri in am as per them, eye examined, no staining with fluorecin but will need optho f/u if bells

## 2022-01-15 NOTE — ED CDU PROVIDER INITIAL DAY NOTE - ATTENDING CONTRIBUTION TO CARE
flynn: 88 yo woman hx left eye blindness (glaucoma) with episode today of right sided facial droop, last seen normal last night,  pt here with droop on right sided, can moved above the eyebrows (wrinkles) good  bilaterally, good leg strength bilaterally.  son present and says her language is appropriate in Italian.  pt with fluent language.  no hx lyme, no hx bells.  denies PEREZ, neuro involved for stroke code.  plan mri in am as per them, eye examined, no staining with fluorecin but will need optho f/u if bells    I performed a history and physical exam of the patient and discussed their management with the resident and /or advanced care provider. I reviewed the resident and /or ACP's note and agree with the documented findings and plan of care. My medical decison making and observations are found above.

## 2022-01-15 NOTE — ED PROVIDER NOTE - ATTENDING CONTRIBUTION TO CARE
flynn: 89 yo woman hx left eye blindness (glaucoma) with episode today of right sided facial droop, last seen normal last night,  pt here with droop on right sided, can moved above the eyebrows (wrinkles) good  bilaterally, good leg strength bilaterally.  son present and says her language is appropriate in Latvian.  pt with fluent language.  no hx lyme, no hx bells.  denies PEREZ, neuro involved for stroke code.  plan mri in am as per them, eye examined, no staining with fluorecin but will need optho f/u if bells    I performed a history and physical exam of the patient and discussed their management with the resident and /or advanced care provider. I reviewed the resident and /or ACP's note and agree with the documented findings and plan of care. My medical decison making and observations are found above.

## 2022-01-15 NOTE — ED ADULT NURSE REASSESSMENT NOTE - NS ED NURSE REASSESS COMMENT FT1
Received pt at change of shift, PT is resting in stretcher, easily arousable to verbal stimuli, A+Ox4. no apparent distress noted. IV placedand  no redness or swelling noted. will continue to monitor. pt refused medications at this time and CDU PA made aware.

## 2022-01-15 NOTE — ED CDU PROVIDER INITIAL DAY NOTE - MEDICAL DECISION MAKING DETAILS
89yF w/pmhx CAD s/p MI (no stents), HTN, HLD, parkinson's?, diverticulosis, duodenal ulcers, chronic systolic heart failure with EF of 35% presented to the ED with right eye blurry vision, right facial droop and left sided paraesthesias x today. Last known well at 5pm. Pt was a code stroke. Pt reports paresthesias in the left leg as well as dizziness, son at bedside states these are chronic complaints.   In main ED CT brain w/perfusion, CTa head/neck without acute abnormality, labs within normal, EKG non ischemic, trop 17, ED team stained right eye no evidence of corneal abrasion  Sent to CDU for MR brain, echo, tele monitoring, neuro following, pending A1c/lipid panel/rpt trop  Optho in morning vs outpt follow up if Markle w/ difficulty keeping right eye closed

## 2022-01-15 NOTE — CONSULT NOTE ADULT - SUBJECTIVE AND OBJECTIVE BOX
HPI:  86F R handed Indonesian speaking w/ CAD [no stents. sp AWSTEMI 2014], HTN, HLD, parkinson's (not on medications), diverticulosis duodenal ulcers, CHF EF: 35% presents with acute onset R eye blurry vision and R facial droop. LKN: 1/14/22 at 17:00 when son at bedside saw her. No previous hx of stroke. At baseline,     (Stroke only)  LKN: 1/14/22 at 17:00  NIHSS: 1  preMRS: 1   Pt is not a candidate for tpa due to outside tpa window    Pt is not a candidate for mechanical thrombectomy due to no large vessel occlusion on CTA    REVIEW OF SYSTEMS  A 10-system ROS was performed and is negative except for those items noted above and/or in the HPI.    PAST MEDICAL & SURGICAL HISTORY:  Hypertension    Hyperlipidemia    GERD (gastroesophageal reflux disease)    Diverticulosis    Glaucoma    Coronary artery disease  s/p late presentation AWSTEMI  Proximal LAD: The distal vessel was supplied by collaterals from  the right posterior descending. There was a 100 % stenosis. There was SHIELA  grade 0 flow through the vessel (no flow).    Parkinson disease    Congestive heart failure  ~ EF = 30 to 35% (06/26/2014)    Cholecystostomy care      FAMILY HISTORY:  No pertinent family history in first degree relatives      SOCIAL HISTORY:   T/E/D:   Occupation:   Lives with:     MEDICATIONS (HOME):  Home Medications:  aspirin 81 mg oral delayed release tablet: 1 tab(s) orally once a day (16 Feb 2019 05:20)  Lipitor 40 mg oral tablet: 1 tab(s) orally once a day    does not take daily (16 Feb 2019 05:20)  metoprolol succinate 25 mg oral tablet, extended release: 1 tab(s) orally once a day (16 Feb 2019 05:20)  Protonix 40 mg oral delayed release tablet: 1 tab(s) orally once a day (16 Feb 2019 05:21)    MEDICATIONS  (STANDING):    MEDICATIONS  (PRN):    ALLERGIES/INTOLERANCES:  Allergies  No Known Allergies    Intolerances    VITALS & EXAMINATION:  Vital Signs Last 24 Hrs  T(C): 36.7 (15 Grover 2022 15:39), Max: 36.7 (15 Grover 2022 15:39)  T(F): 98 (15 Grover 2022 15:39), Max: 98 (15 Grover 2022 15:39)  HR: 68 (15 Grover 2022 15:39) (68 - 68)  BP: 140/73 (15 Grover 2022 15:39) (140/73 - 140/73)  BP(mean): --  RR: 20 (15 Grover 2022 15:39) (20 - 20)  SpO2: 98% (15 Grover 2022 15:39) (98% - 98%)    General:  Constitutional: Obese Female, appears stated age, in no apparent distress including pain  Head: Normocephalic & atraumatic.  Respiratory: Patent airway. All lung fields are clear to auscultation bilaterally.  Extremities: No cyanosis, clubbing, or edema.  Skin: No rashes, bruising, or discoloration.    Neurological (>12):  MS: Awake, alert, oriented to person, place, situation, time. Normal affect. Follows all commands.    Language: Speech is clear, fluent with good repetition & comprehension (able to name objects___)    CNs: PERRLA (R = 3mm, L = 3mm). VFF. EOMI no nystagmus, no diplopia. V1-3 intact to LT/pinprick, well developed masseter muscles b/l. No facial asymmetry b/l, full eye closure strength b/l. Hearing grossly normal (rubbing fingers) b/l. Symmetric palate elevation in midline. Gag reflex deferred. Head turning & shoulder shrug intact b/l. Tongue midline, normal movements, no atrophy.     Motor: Normal muscle bulk & tone. No noticeable tremor or seizure. No pronator drift.              Deltoid	Biceps	Triceps	Wrist	Finger ABd	   R	5	5	5	5	5		5 	  L	5	5	5	5	5		5    	H-Flex	H-Ext	H-ABd	H-ADd	K-Flex	K-Ext	D-Flex	P-Flex  R	5	5	5	5	5	5	5	5 	   L	5	5	5	5	5	5	5	5	     Sensation: Intact to LT/PP/Temp/Vibration/Position b/l throughout.     Cortical: Extinction on DSS (neglect): none    Reflexes:              Biceps(C5)       BR(C6)     Triceps(C7)               Patellar(L4)    Achilles(S1)    Plantar Resp  R	2	          2	             2		        2		    2		Down   L	2	          2	             2		        2		    2		Down     Coordination: intact rapid-alt movements. No dysmetria to FTN/HTS    Gait: Normal Romberg. No postural instability. Normal stance and tandem gait.     LABORATORY:    Other    STUDIES & IMAGING:  Studies (EKG, EEG, EMG, etc):     Radiology (XR, CT, MR, U/S, TTE/MARGI): HPI:  86F R handed Romansh speaking w/ CAD [no stents. sp AWSTEMI 2014], HTN, HLD, parkinson's? (not on medications), diverticulosis duodenal ulcers, CHF EF: 35% presents with acute onset R eye blurry vision and R facial droop. LKN: 1/14/22 at 17:00 when son at bedside saw her. No previous hx of stroke. At baseline per son, pt has no significant memory issues, ambulates occasionally with cane, does all ADLs independently (feeding, showering).     (Stroke only)  LKN: 1/14/22 at 17:00  NIHSS: 1  preMRS: 1   Pt is not a candidate for tpa due to outside tpa window    Pt is not a candidate for mechanical thrombectomy due to no large vessel occlusion on CTA    REVIEW OF SYSTEMS  A 10-system ROS was performed and is negative except for those items noted above and/or in the HPI.    PAST MEDICAL & SURGICAL HISTORY:  Hypertensionttam    Hyperlipidemia    GERD (gastroesophageal reflux disease)    Diverticulosis    Glaucoma    Coronary artery disease  s/p late presentation AWSTEMI  Proximal LAD: The distal vessel was supplied by collaterals from  the right posterior descending. There was a 100 % stenosis. There was SHIELA  grade 0 flow through the vessel (no flow).    Parkinson disease    Congestive heart failure  ~ EF = 30 to 35% (06/26/2014)    Cholecystostomy care      FAMILY HISTORY:  No pertinent family history in first degree relatives      SOCIAL HISTORY:   T/E/D:   Occupation:   Lives with:     MEDICATIONS (HOME):  Home Medications:  aspirin 81 mg oral delayed release tablet: 1 tab(s) orally once a day (16 Feb 2019 05:20)  Lipitor 40 mg oral tablet: 1 tab(s) orally once a day    does not take daily (16 Feb 2019 05:20)  metoprolol succinate 25 mg oral tablet, extended release: 1 tab(s) orally once a day (16 Feb 2019 05:20)  Protonix 40 mg oral delayed release tablet: 1 tab(s) orally once a day (16 Feb 2019 05:21)    MEDICATIONS  (STANDING):    MEDICATIONS  (PRN):    ALLERGIES/INTOLERANCES:  Allergies  No Known Allergies    Intolerances    VITALS & EXAMINATION:  Vital Signs Last 24 Hrs  T(C): 36.7 (15 Grover 2022 15:39), Max: 36.7 (15 Grover 2022 15:39)  T(F): 98 (15 Grover 2022 15:39), Max: 98 (15 Grover 2022 15:39)  HR: 68 (15 Grover 2022 15:39) (68 - 68)  BP: 140/73 (15 Grover 2022 15:39) (140/73 - 140/73)  BP(mean): --  RR: 20 (15 Grover 2022 15:39) (20 - 20)  SpO2: 98% (15 Grover 2022 15:39) (98% - 98%)    General:  Constitutional: Obese Female, appears stated age, in no apparent distress including pain  Head: Normocephalic & atraumatic.  Respiratory: Patent airway. All lung fields are clear to auscultation bilaterally.  Extremities: No cyanosis, clubbing, or edema.  Skin: No rashes, bruising, or discoloration.    Neurological (>12):  MS: Awake, alert, oriented to person, place, situation, time. Normal affect. Follows all commands.    Language: Speech is clear, fluent with good repetition & comprehension (able to name objects___)    CNs: PERRLA (R = 3mm, L = 3mm). VFF. EOMI no nystagmus, no diplopia. V1-3 intact to LT/pinprick, well developed masseter muscles b/l. No facial asymmetry b/l, full eye closure strength b/l. Hearing grossly normal (rubbing fingers) b/l. Symmetric palate elevation in midline. Gag reflex deferred. Head turning & shoulder shrug intact b/l. Tongue midline, normal movements, no atrophy.     Motor: Normal muscle bulk & tone. No noticeable tremor or seizure. No pronator drift.              Deltoid	Biceps	Triceps	Wrist	Finger ABd	   R	5	5	5	5	5		5 	  L	5	5	5	5	5		5    	H-Flex	H-Ext	H-ABd	H-ADd	K-Flex	K-Ext	D-Flex	P-Flex  R	5	5	5	5	5	5	5	5 	   L	5	5	5	5	5	5	5	5	     Sensation: Intact to LT/PP/Temp/Vibration/Position b/l throughout.     Cortical: Extinction on DSS (neglect): none    Reflexes:              Biceps(C5)       BR(C6)     Triceps(C7)               Patellar(L4)    Achilles(S1)    Plantar Resp  R	2	          2	             2		        2		    2		Down   L	2	          2	             2		        2		    2		Down     Coordination: intact rapid-alt movements. No dysmetria to FTN/HTS    Gait: Normal Romberg. No postural instability. Normal stance and tandem gait.     LABORATORY:    Other    STUDIES & IMAGING:  Studies (EKG, EEG, EMG, etc):     Radiology (XR, CT, MR, U/S, TTE/MARGI): HPI:  86F R handed Yoruba speaking w/ CAD [no stents, s/p AWSTEMI 2014], CHF EF: 35%, HTN, HLD, parkinson's? (not on medications), diverticulosis, glaucoma s/p L eye surgery (w/ residual L eye ptosis and blindness) presents with acute onset R eye blurry vision and R facial droop. LKN: 1/14/22 at 17:00 when son at bedside saw her. Pt reports R eye blurry vision that started around 9pm last night, now resolved. Pt's son noticed when he saw her at 2pm on 1/15/22 and noticed a R facial droop and that her speech was slightly slurred but answering questions appropriately w/ no word finding difficulty. No previous hx of stroke. On aspirin 81mg 3x/week, no anticoagulation. At baseline per son, pt has no significant memory issues, ambulates occasionally with cane, does all ADLs independently (feeding, showering).     (Stroke only)  LKN: 1/14/22 at 17:00  NIHSS: 1  preMRS: 1   Pt is not a candidate for tpa due to outside tpa window    Pt is not a candidate for mechanical thrombectomy due to no large vessel occlusion on CTA    REVIEW OF SYSTEMS  A 10-system ROS was performed and is negative except for those items noted above and/or in the HPI.    PAST MEDICAL & SURGICAL HISTORY:  Hypertensionttam    Hyperlipidemia    GERD (gastroesophageal reflux disease)    Diverticulosis    Glaucoma    Coronary artery disease  s/p late presentation AWSTEMI  Proximal LAD: The distal vessel was supplied by collaterals from  the right posterior descending. There was a 100 % stenosis. There was SHIELA  grade 0 flow through the vessel (no flow).    Parkinson disease    Congestive heart failure  ~ EF = 30 to 35% (06/26/2014)    Cholecystostomy care      FAMILY HISTORY:  No pertinent family history in first degree relatives      SOCIAL HISTORY:   T/E/D:   Occupation:   Lives with:     MEDICATIONS (HOME):  Home Medications:  aspirin 81 mg oral delayed release tablet: 1 tab(s) orally once a day (16 Feb 2019 05:20)  Lipitor 40 mg oral tablet: 1 tab(s) orally once a day    does not take daily (16 Feb 2019 05:20)  metoprolol succinate 25 mg oral tablet, extended release: 1 tab(s) orally once a day (16 Feb 2019 05:20)  Protonix 40 mg oral delayed release tablet: 1 tab(s) orally once a day (16 Feb 2019 05:21)    MEDICATIONS  (STANDING):    MEDICATIONS  (PRN):    ALLERGIES/INTOLERANCES:  Allergies  No Known Allergies    Intolerances    VITALS & EXAMINATION:  Vital Signs Last 24 Hrs  T(C): 36.7 (15 Grover 2022 15:39), Max: 36.7 (15 Grover 2022 15:39)  T(F): 98 (15 Grover 2022 15:39), Max: 98 (15 Grover 2022 15:39)  HR: 68 (15 Grover 2022 15:39) (68 - 68)  BP: 140/73 (15 Grover 2022 15:39) (140/73 - 140/73)  BP(mean): --  RR: 20 (15 Grover 2022 15:39) (20 - 20)  SpO2: 98% (15 Grover 2022 15:39) (98% - 98%)    General:  Constitutional: Female, appears stated age, in no apparent distress including pain  Head: Normocephalic & atraumatic.  Respiratory: No increased work of breathing  Extremities: No cyanosis, clubbing, or edema.  Skin: No rashes, bruising, or discoloration.    Neurological (>12):  MS: Awake, alert, oriented to person, place, situation, time. Normal affect. Follows all commands.    Language: Speech is mildly dysarthric, fluent with good repetition & comprehension (able to name objects thumb, mask)    CNs: Chronic L eye moderate ptosis and  EOMI no nystagmus, no diplopia. V1-3 intact to LT/pinprick, well developed masseter muscles b/l. No facial asymmetry b/l, full eye closure strength b/l. Hearing grossly normal (rubbing fingers) b/l. Symmetric palate elevation in midline. Gag reflex deferred. Head turning & shoulder shrug intact b/l. Tongue midline, normal movements, no atrophy.     Motor: Normal muscle bulk & tone. No noticeable tremor or seizure. No pronator drift.              Deltoid	Biceps	Triceps	Wrist	Finger ABd	   R	5	5	5	5	5		5 	  L	5	5	5	5	5		5    	H-Flex	H-Ext	H-ABd	H-ADd	K-Flex	K-Ext	D-Flex	P-Flex  R	5	5	5	5	5	5	5	5 	   L	5	5	5	5	5	5	5	5	     Sensation: Intact to LT/PP/Temp/Vibration/Position b/l throughout.     Cortical: Extinction on DSS (neglect): none    Reflexes:              Biceps(C5)       BR(C6)     Triceps(C7)               Patellar(L4)    Achilles(S1)    Plantar Resp  R	2	          2	             2		        2		    2		Down   L	2	          2	             2		        2		    2		Down     Coordination: intact rapid-alt movements. No dysmetria to FTN/HTS    Gait: Normal Romberg. No postural instability. Normal stance and tandem gait.     LABORATORY:    Other    STUDIES & IMAGING:  Studies (EKG, EEG, EMG, etc):     Radiology (XR, CT, MR, U/S, TTE/MARGI):    < from: CT Brain Stroke Protocol (01.15.22 @ 16:22) >  IMPRESSION:    CT brain: No acute intracranial hemorrhage, mass effect, midline shift.   If clinical symptoms persist, consider follow-up CT or MRI.    CTA neck and brain: Mild calcification at the left carotid bifurcation.   Otherwise, no vessel occlusion or significant stenosis.    < end of copied text >  < from: CT Angio Neck w/ IV Cont (01.15.22 @ 16:22) >  FINDINGS:    CT BRAIN:  PARENCHYMA AND VENTRICLES: No acute intracranial hemorrhage, mass effect,   or midline shift. Age appropriate involutional changes and small vessel   white matter changes.No hydrocephalus.  EXTRA-AXIAL:  No abnormal extraaxial collection.  PARANASAL SINUSES: Within normal limits.  TYMPANOMASTOID CAVITIES: Within normal limits.  ORBITS: Bilateral lens replacements. Otherwise, within normal limits.    CTA NECK AND BRAIN:  GREAT VESSELS: Within normal limits.  COMMON CAROTID ARTERIES: Within normal limits.  CAROTID BIFURCATIONS: Mild calcification at the LEFT carotid bifurcation   without hemodynamically significant stenosis. No evidence of   hemodynamically significant stenosis at the origin of the RIGHT carotid   bifurcation.  INTERNAL CAROTID ARTERIES: Patent without stenosis.    VERTEBRAL ARTERIES: Patent without stenosis.    ANTERIOR CIRCULATION: Patent without stenosis or aneurysm.  POSTERIOR CIRCULATION: Patent without stenosis or aneurysm.    VISUALIZED LUNGS: Within normal limits.  NECK SOFT TISSUES: Within normal limits.  BONES: Within normal limits.      IMPRESSION:    CT brain: No acute intracranial hemorrhage, mass effect, midline shift.   If clinical symptoms persist, consider follow-up CT or MRI.    CTA neck and brain: Mild calcification at the left carotid bifurcation.   Otherwise, no vessel occlusion or significant stenosis.    < end of copied text >   HPI:  86F R handed Irish speaking w/ CAD [no stents, s/p AWSTEMI 2014], CHF EF: 35%, HTN, HLD, parkinson's? (not on medications), diverticulosis, glaucoma s/p L eye surgery (w/ residual L eye ptosis and blindness) presents with acute onset R eye blurry vision and R facial droop. LKN: 1/14/22 at 17:00 when son at bedside saw her. Pt reports R eye blurry vision that started around 9pm last night, now resolved. Pt denied any R eye ptosis or diplopia. Pt's son noticed when he saw her at 2pm on 1/15/22 and noticed a R facial droop and that her speech was slightly slurred but answering questions appropriately w/ no word finding difficulty. No previous hx of stroke. On aspirin 81mg 3x/week, no anticoagulation. At baseline per son, pt has no significant memory issues, ambulates occasionally with cane, does all ADLs independently (feeding, showering). For her Parkinson's, son is not sure when she was diagnosed and pt does not know if it started unilaterally. Per son, pt had seen a doctor for it about 4-5 years ago once and was not started on a medication for her tremor.     (Stroke only)  LKN: 1/14/22 at 17:00  NIHSS: 2  preMRS: 1   Pt is not a candidate for tpa due to outside tpa window    Pt is not a candidate for mechanical thrombectomy due to no large vessel occlusion on CTA    REVIEW OF SYSTEMS  A 10-system ROS was performed and is negative except for those items noted above and/or in the HPI.    PAST MEDICAL & SURGICAL HISTORY:  Hypertension    Hyperlipidemia    GERD (gastroesophageal reflux disease)    Diverticulosis    Glaucoma    Coronary artery disease  s/p late presentation AWSTEMI  Proximal LAD: The distal vessel was supplied by collaterals from  the right posterior descending. There was a 100 % stenosis. There was SHIELA  grade 0 flow through the vessel (no flow).    Parkinson disease    Congestive heart failure  ~ EF = 30 to 35% (06/26/2014)    Cholecystostomy care      FAMILY HISTORY:  No pertinent family history in first degree relatives      SOCIAL HISTORY:   T/E/D:   Occupation:   Lives with:     MEDICATIONS (HOME):  Home Medications:  aspirin 81 mg oral delayed release tablet: 1 tab(s) orally once a day (16 Feb 2019 05:20)  Lipitor 40 mg oral tablet: 1 tab(s) orally once a day    does not take daily (16 Feb 2019 05:20)  metoprolol succinate 25 mg oral tablet, extended release: 1 tab(s) orally once a day (16 Feb 2019 05:20)  Protonix 40 mg oral delayed release tablet: 1 tab(s) orally once a day (16 Feb 2019 05:21)    MEDICATIONS  (STANDING):    MEDICATIONS  (PRN):    ALLERGIES/INTOLERANCES:  Allergies  No Known Allergies    Intolerances    VITALS & EXAMINATION:  Vital Signs Last 24 Hrs  T(C): 36.7 (15 Grover 2022 15:39), Max: 36.7 (15 Grover 2022 15:39)  T(F): 98 (15 Grover 2022 15:39), Max: 98 (15 Grover 2022 15:39)  HR: 68 (15 Grover 2022 15:39) (68 - 68)  BP: 140/73 (15 Grover 2022 15:39) (140/73 - 140/73)  BP(mean): --  RR: 20 (15 Grover 2022 15:39) (20 - 20)  SpO2: 98% (15 Grover 2022 15:39) (98% - 98%)    General:  Constitutional: Female, appears stated age, in no apparent distress including pain  Head: Normocephalic & atraumatic.  Respiratory: No increased work of breathing  Extremities: No cyanosis, clubbing, or edema.  Skin: No rashes, bruising, or discoloration.    Neurological (>12):  MS: Awake, alert, oriented to person, place, situation, time. Normal affect. Follows all commands.    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 EOMI no nystagmus. V1-3 intact to LT  well developed masseter muscles b/l. R nasolabial fold flattening, full eye closure strength b/l. Hearing grossly normal (rubbing fingers) b/l. Symmetric palate elevation in midline. Gag reflex deferred. Head turning & shoulder shrug intact b/l. Tongue midline, normal movements, no atrophy.     Motor: Normal muscle bulk & tone. B/l resting, postural, and action tremor in UE. No pronator drift on subsequent exam. On initial exam, noted mild 4+/5 L deltoid, biceps, triceps weakness however no drift  On subsequent exam:              Deltoid	Biceps	Triceps	Wrist	Finger ABd	   R	5	5	5	5			5 	  L	5	5	5	5			5    	H-Flex	K-Flex	K-Ext	D-Flex	P-Flex  R	5	5	5	5	5	   L	5	5	5	5	5	     Sensation: Intact to LT/PP/Temp/Vibration/Position b/l throughout.     Cortical: Extinction on DSS (neglect): none    Reflexes:              Biceps(C5)       BR(C6)     Triceps(C7)               Patellar(L4)    Achilles(S1)    Plantar Resp  R	2	          2	             2		        2		    2		Down   L	2	          2	             2		        2		    2		Down     Coordination: intact rapid-alt movements. No dysmetria to FTN/HTS    Gait: Normal Romberg. No postural instability. Normal stance and tandem gait.     LABORATORY:    Other    STUDIES & IMAGING:  Studies (EKG, EEG, EMG, etc):     Radiology (XR, CT, MR, U/S, TTE/MARGI):    < from: CT Brain Stroke Protocol (01.15.22 @ 16:22) >  IMPRESSION:    CT brain: No acute intracranial hemorrhage, mass effect, midline shift.   If clinical symptoms persist, consider follow-up CT or MRI.    CTA neck and brain: Mild calcification at the left carotid bifurcation.   Otherwise, no vessel occlusion or significant stenosis.    < end of copied text >  < from: CT Angio Neck w/ IV Cont (01.15.22 @ 16:22) >  FINDINGS:    CT BRAIN:  PARENCHYMA AND VENTRICLES: No acute intracranial hemorrhage, mass effect,   or midline shift. Age appropriate involutional changes and small vessel   white matter changes.No hydrocephalus.  EXTRA-AXIAL:  No abnormal extraaxial collection.  PARANASAL SINUSES: Within normal limits.  TYMPANOMASTOID CAVITIES: Within normal limits.  ORBITS: Bilateral lens replacements. Otherwise, within normal limits.    CTA NECK AND BRAIN:  GREAT VESSELS: Within normal limits.  COMMON CAROTID ARTERIES: Within normal limits.  CAROTID BIFURCATIONS: Mild calcification at the LEFT carotid bifurcation   without hemodynamically significant stenosis. No evidence of   hemodynamically significant stenosis at the origin of the RIGHT carotid   bifurcation.  INTERNAL CAROTID ARTERIES: Patent without stenosis.    VERTEBRAL ARTERIES: Patent without stenosis.    ANTERIOR CIRCULATION: Patent without stenosis or aneurysm.  POSTERIOR CIRCULATION: Patent without stenosis or aneurysm.    VISUALIZED LUNGS: Within normal limits.  NECK SOFT TISSUES: Within normal limits.  BONES: Within normal limits.      IMPRESSION:    CT brain: No acute intracranial hemorrhage, mass effect, midline shift.   If clinical symptoms persist, consider follow-up CT or MRI.    CTA neck and brain: Mild calcification at the left carotid bifurcation.   Otherwise, no vessel occlusion or significant stenosis.    < end of copied text >   HPI:  89F R handed Yoruba speaking w/ CAD [no stents, s/p AWSTEMI 2014], CHF EF: 35%, HTN, HLD, parkinson's? (not on medications), diverticulosis, glaucoma s/p L eye surgery (w/ residual L eye ptosis and blindness) presents with acute onset R eye blurry vision and R facial droop. LKN: 1/14/22 at 17:00 when son at bedside saw her. Pt reports R eye blurry vision that started around 9pm last night, now resolved. Pt denied any R eye ptosis or diplopia. Pt's son noticed when he saw her at 2pm on 1/15/22 and noticed a R facial droop and that her speech was slightly slurred but answering questions appropriately w/ no word finding difficulty. No previous hx of stroke. On aspirin 81mg 3x/week, no anticoagulation. At baseline per son, pt has no significant memory issues, ambulates occasionally with cane, does all ADLs independently (feeding, showering). For her Parkinson's, son is not sure when she was diagnosed and pt does not know if it started unilaterally. Per son, pt had seen a doctor for it about 4-5 years ago once and was not started on a medication for her tremor.     (Stroke only)  LKN: 1/14/22 at 17:00  NIHSS: 2  preMRS: 1   Pt is not a candidate for tpa due to outside tpa window    Pt is not a candidate for mechanical thrombectomy due to no large vessel occlusion on CTA    REVIEW OF SYSTEMS  A 10-system ROS was performed and is negative except for those items noted above and/or in the HPI.    PAST MEDICAL & SURGICAL HISTORY:  Hypertension    Hyperlipidemia    GERD (gastroesophageal reflux disease)    Diverticulosis    Glaucoma    Coronary artery disease  s/p late presentation AWSTEMI  Proximal LAD: The distal vessel was supplied by collaterals from  the right posterior descending. There was a 100 % stenosis. There was SHIELA  grade 0 flow through the vessel (no flow).    Parkinson disease    Congestive heart failure  ~ EF = 30 to 35% (06/26/2014)    Cholecystostomy care      FAMILY HISTORY:  No pertinent family history in first degree relatives      SOCIAL HISTORY:   T/E/D:   Occupation:   Lives with:     MEDICATIONS (HOME):  Home Medications:  aspirin 81 mg oral delayed release tablet: 1 tab(s) orally once a day (16 Feb 2019 05:20)  Lipitor 40 mg oral tablet: 1 tab(s) orally once a day    does not take daily (16 Feb 2019 05:20)  metoprolol succinate 25 mg oral tablet, extended release: 1 tab(s) orally once a day (16 Feb 2019 05:20)  Protonix 40 mg oral delayed release tablet: 1 tab(s) orally once a day (16 Feb 2019 05:21)    MEDICATIONS  (STANDING):    MEDICATIONS  (PRN):    ALLERGIES/INTOLERANCES:  Allergies  No Known Allergies    Intolerances    VITALS & EXAMINATION:  Vital Signs Last 24 Hrs  T(C): 36.7 (15 Grover 2022 15:39), Max: 36.7 (15 Grover 2022 15:39)  T(F): 98 (15 Grover 2022 15:39), Max: 98 (15 Grover 2022 15:39)  HR: 68 (15 Grover 2022 15:39) (68 - 68)  BP: 140/73 (15 Grover 2022 15:39) (140/73 - 140/73)  BP(mean): --  RR: 20 (15 Grover 2022 15:39) (20 - 20)  SpO2: 98% (15 Grover 2022 15:39) (98% - 98%)    General:  Constitutional: Female, appears stated age, in no apparent distress including pain  Head: Normocephalic & atraumatic.  Respiratory: No increased work of breathing  Extremities: No cyanosis, clubbing, or edema.  Skin: No rashes, bruising, or discoloration.    Neurological (>12):  MS: Awake, alert, oriented to person, place, situation, time. Normal affect. Follows all commands.    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 EOMI no nystagmus. V1-3 intact to LT  well developed masseter muscles b/l. R nasolabial fold flattening, full eye closure strength b/l. Hearing grossly normal (rubbing fingers) b/l. Symmetric palate elevation in midline. Gag reflex deferred. Head turning & shoulder shrug intact b/l. Tongue midline, normal movements, no atrophy.     Motor: Normal muscle bulk & tone. B/l resting, postural, and action tremor in UE. No pronator drift on subsequent exam. On initial exam, noted mild 4+/5 L deltoid, biceps, triceps weakness however no drift  On subsequent exam:              Deltoid	Biceps	Triceps	Wrist	Finger ABd	   R	5	5	5	5			5 	  L	5	5	5	5			5    	H-Flex	K-Flex	K-Ext	D-Flex	P-Flex  R	5	5	5	5	5	   L	5	5	5	5	5	     Sensation: Intact to LT/PP/Temp/Vibration/Position b/l throughout.     Cortical: Extinction on DSS (neglect): none    Reflexes:              Biceps(C5)       BR(C6)     Triceps(C7)               Patellar(L4)    Achilles(S1)    Plantar Resp  R	2	          2	             2		        2		    2		Down   L	2	          2	             2		        2		    2		Down     Coordination: intact rapid-alt movements. No dysmetria to FTN/HTS    Gait: Normal Romberg. No postural instability. Normal stance and tandem gait.     LABORATORY:    Other    STUDIES & IMAGING:  Studies (EKG, EEG, EMG, etc):     Radiology (XR, CT, MR, U/S, TTE/MARGI):    < from: CT Brain Stroke Protocol (01.15.22 @ 16:22) >  IMPRESSION:    CT brain: No acute intracranial hemorrhage, mass effect, midline shift.   If clinical symptoms persist, consider follow-up CT or MRI.    CTA neck and brain: Mild calcification at the left carotid bifurcation.   Otherwise, no vessel occlusion or significant stenosis.    < end of copied text >  < from: CT Angio Neck w/ IV Cont (01.15.22 @ 16:22) >  FINDINGS:    CT BRAIN:  PARENCHYMA AND VENTRICLES: No acute intracranial hemorrhage, mass effect,   or midline shift. Age appropriate involutional changes and small vessel   white matter changes.No hydrocephalus.  EXTRA-AXIAL:  No abnormal extraaxial collection.  PARANASAL SINUSES: Within normal limits.  TYMPANOMASTOID CAVITIES: Within normal limits.  ORBITS: Bilateral lens replacements. Otherwise, within normal limits.    CTA NECK AND BRAIN:  GREAT VESSELS: Within normal limits.  COMMON CAROTID ARTERIES: Within normal limits.  CAROTID BIFURCATIONS: Mild calcification at the LEFT carotid bifurcation   without hemodynamically significant stenosis. No evidence of   hemodynamically significant stenosis at the origin of the RIGHT carotid   bifurcation.  INTERNAL CAROTID ARTERIES: Patent without stenosis.    VERTEBRAL ARTERIES: Patent without stenosis.    ANTERIOR CIRCULATION: Patent without stenosis or aneurysm.  POSTERIOR CIRCULATION: Patent without stenosis or aneurysm.    VISUALIZED LUNGS: Within normal limits.  NECK SOFT TISSUES: Within normal limits.  BONES: Within normal limits.      IMPRESSION:    CT brain: No acute intracranial hemorrhage, mass effect, midline shift.   If clinical symptoms persist, consider follow-up CT or MRI.    CTA neck and brain: Mild calcification at the left carotid bifurcation.   Otherwise, no vessel occlusion or significant stenosis.    < end of copied text >

## 2022-01-15 NOTE — CONSULT NOTE ADULT - ATTENDING COMMENTS
code stroke called in ED and neurology emergently assessed patient. Briefly   89F R handed [place]inean speaking w/ CAD [no stents, s/p AWSTEMI 2014], CHF EF: 35%, HTN, HLD, parkinson's? (not on medications), diverticulosis, glaucoma s/p L eye surgery (w/ residual L eye ptosis and blindness) presents with acute onset R eye blurry vision and R facial droop. LKN: 1/14/22 at 17:00 when son at bedside saw her. Pt reports R eye blurry vision that started around 9pm last night, now resolved.   Neuro exam notable for R facial droop, mild dysarthria resting/postural/action tremor b/l UE. NIHSS: 2, preMRS: 1.   CTH neg, CTA w/ mild calcification of L carotid bifurcation. No tpa, no MT. out of tpa window. no LVO on CTA     Impression: R facial droop (forehead sparing),  unclear etiology, possibly 2/2 TIA/minor stroke (pt w/ several risk factors), possible Bell's palsy    Recommendations:  #R facial droop, dysarthria  - MRI brain without contrast if negative would consider treating for bells with prednisone and valtrex for 1 week  - TTE, can be done inpatient vs. outpatient  - monitor on telemetry  - start ASA 81 mg PO daily   - start atorvastatin 40mg PO daily (titrate to LDL < 70)   - stroke risk factor modification and counseling   - check HA1c, lipid panel  - NPO until bedside S&S  - Patient can follow up with Dr. Armando Nuñez after discharge. Please instruct the patient to call [phone] to schedule an appointment. Office is located at [address] [facility], [place] [address].  - further recs based on above     Armando Nuñez MD  Vascular Neurology

## 2022-01-15 NOTE — ED CDU PROVIDER INITIAL DAY NOTE - NSICDXFAMILYHX_GEN_ALL_CORE_FT
FAMILY HISTORY:  No pertinent family history in first degree relatives FAMILY HISTORY:  Mother  Still living? Unknown  FH: hypertension, Age at diagnosis: Age Unknown

## 2022-01-15 NOTE — ED CDU PROVIDER INITIAL DAY NOTE - PROGRESS NOTE DETAILS
Received signout on pt, pt was reassessed, well appearing, MRI screening form sent. Pt's son was used for translation.

## 2022-01-15 NOTE — ED ADULT TRIAGE NOTE - CCCP TRG CHIEF CMPLNT
Mauc Instructions: By selecting yes to the question below the MAUC number will be added into the note.  This will be calculated automatically based on the diagnosis chosen, the size entered, the body zone selected (H,M,L) and the specific indications you chose. You will also have the option to override the Mohs AUC if you disagree with the automatically calculated number and this option is found in the Case Summary tab. right side facial droop/facial droop

## 2022-01-15 NOTE — ED PROVIDER NOTE - OBJECTIVE STATEMENT
89F R handed Maori speaking w/ CAD [no stents. ant wall STEMI 2014], HTN, HLD, parkinson's? (not on medications), diverticulosis duodenal ulcers, CHF EF: 35%, LKN 5pm 1/14, presents to the ED for right eye blurry vision and right facial droop. No prior stroke hx. Patient unable to see out of left eye 2/2 glaucoma. Per patient, she had left sided numbness, transient. No difficulty with gait. Denies chest pain, shortness of breath, dizziness, headache, nausea, vomiting.

## 2022-01-16 VITALS
HEART RATE: 74 BPM | DIASTOLIC BLOOD PRESSURE: 61 MMHG | RESPIRATION RATE: 18 BRPM | SYSTOLIC BLOOD PRESSURE: 129 MMHG | TEMPERATURE: 98 F | OXYGEN SATURATION: 100 %

## 2022-01-16 LAB
B BURGDOR C6 AB SER-ACNC: NEGATIVE — SIGNIFICANT CHANGE UP
B BURGDOR IGG+IGM SER-ACNC: 0.44 INDEX — SIGNIFICANT CHANGE UP (ref 0.01–0.89)
CHOLEST SERPL-MCNC: 124 MG/DL — SIGNIFICANT CHANGE UP
HDLC SERPL-MCNC: 60 MG/DL — SIGNIFICANT CHANGE UP
LIPID PNL WITH DIRECT LDL SERPL: 49 MG/DL — SIGNIFICANT CHANGE UP
NON HDL CHOLESTEROL: 64 MG/DL — SIGNIFICANT CHANGE UP
T PALLIDUM AB TITR SER: NEGATIVE — SIGNIFICANT CHANGE UP
TRIGL SERPL-MCNC: 76 MG/DL — SIGNIFICANT CHANGE UP

## 2022-01-16 PROCEDURE — 93306 TTE W/DOPPLER COMPLETE: CPT | Mod: 26

## 2022-01-16 PROCEDURE — 99217: CPT | Mod: FS

## 2022-01-16 PROCEDURE — 70551 MRI BRAIN STEM W/O DYE: CPT | Mod: 26,MA

## 2022-01-16 RX ORDER — METOPROLOL TARTRATE 50 MG
25 TABLET ORAL DAILY
Refills: 0 | Status: DISCONTINUED | OUTPATIENT
Start: 2022-01-16 | End: 2022-01-19

## 2022-01-16 RX ORDER — ACETAMINOPHEN 500 MG
650 TABLET ORAL ONCE
Refills: 0 | Status: COMPLETED | OUTPATIENT
Start: 2022-01-16 | End: 2022-01-16

## 2022-01-16 RX ADMIN — Medication 25 MILLIGRAM(S): at 06:24

## 2022-01-16 RX ADMIN — Medication 81 MILLIGRAM(S): at 12:02

## 2022-01-16 RX ADMIN — CLOPIDOGREL BISULFATE 75 MILLIGRAM(S): 75 TABLET, FILM COATED ORAL at 12:02

## 2022-01-16 RX ADMIN — Medication 650 MILLIGRAM(S): at 15:09

## 2022-01-16 RX ADMIN — Medication 40 MILLIGRAM(S): at 14:02

## 2022-01-16 NOTE — ED CDU PROVIDER SUBSEQUENT DAY NOTE - HISTORY
89yF w/pmhx CAD s/p MI (no stents), HTN, HLD, parkinson's?, diverticulosis, duodenal ulcers, chronic systolic heart failure with EF of 35% presented to the ED with right eye blurry vision, right facial droop and left sided paraesthesias. Last known well at 5pm. Pt was a code stroke. Pt reports since this morning she noticed her vision was foggy in the right eye. Pt reports paresthesias in the left leg as well as dizziness, son at bedside states these are chronic complaints. Pt denies difficulty speaking or swallowing, headache, eye pain, URI symptoms, cp, sob, abd pain, n/v/d, recent travel or illness or any other concerns. Pt is Japanese speaking, son Jude provided translation  In main ED CT brain w/perfusion, CTa head/neck without acute abnormality, labs within normal, EKG non ischemic, trop 17, ED team stained right eye no evidence of corneal abrasion  Sent to CDU for MR brain, echo, tele monitoring, neuro following, pending A1c/lipid panel/rpt trop  Optho in morning vs outpt follow up if [place] w/ difficulty keeping right eye closed  CDU SHANE Lo: Agree with above. Pt has been stable while in CDU, awaiting MRI. 89yF w/pmhx CAD s/p MI (no stents), HTN, HLD, parkinson's?, diverticulosis, duodenal ulcers, chronic systolic heart failure with EF of 35% presented to the ED with right eye blurry vision, right facial droop and left sided paraesthesias. Last known well at 5pm. Pt was a code stroke. Pt reports since this morning she noticed her vision was foggy in the right eye. Pt reports paresthesias in the left leg as well as dizziness, son at bedside states these are chronic complaints. Pt denies difficulty speaking or swallowing, headache, eye pain, URI symptoms, cp, sob, abd pain, n/v/d, recent travel or illness or any other concerns. Pt is [place]an speaking, son Taoist provided translation. Pt has been stable while in CDU, no events on telemetry to date, awaiting MRI and echo.

## 2022-01-16 NOTE — ED CDU PROVIDER DISPOSITION NOTE - CLINICAL COURSE
88 y/o female pmh htn, dm, parkinsons, CAD c/o R sided facial droop. Pt had normal CTA head and neck in the ER, was seen by neurology and sent to the CDU for tele monitoring and MRI to r/o CVA. Pt had no events overnight, MRI was negative for acute pathology an dpt was cleared by neuro for dc and rec to treat for possible bells palsy as cause for symptoms. Pt to f/u as outpatient with neuro.

## 2022-01-16 NOTE — ED CDU PROVIDER DISPOSITION NOTE - PATIENT PORTAL LINK FT
You can access the FollowMyHealth Patient Portal offered by [facility] by registering at the following website: [email or web address]. By joining [facility]’s FollowMyHealth portal, you will also be able to view your health information using other applications (apps) compatible with our system.

## 2022-01-16 NOTE — ED CDU PROVIDER SUBSEQUENT DAY NOTE - MEDICAL DECISION MAKING DETAILS
89yF w/pmhx CAD s/p MI (no stents), HTN, HLD, parkinson's?, diverticulosis, duodenal ulcers, chronic systolic heart failure with EF of 35% presented to the ED with right eye blurry vision, right facial droop and left sided paraesthesias. Plan is MRI, Echo and Neurology reassessment, monitoring on Telemetry.

## 2022-01-16 NOTE — ED CDU PROVIDER SUBSEQUENT DAY NOTE - ATTENDING CONTRIBUTION TO CARE
89yF w/pmhx CAD s/p MI (no stents), HTN, HLD, parkinson's?, diverticulosis, duodenal ulcers, chronic systolic heart failure with EF of 35% presented to the ED with right eye blurry vision, right facial droop and left sided paraesthesias. CTs negative in ED, pPlan is MRI, Echo and Neurology reassessment, monitoring on Telemetry. MRI negative, pending echo results and neuro for dispo 89yF w/pmhx CAD s/p MI (no stents), HTN, HLD, parkinson's?, diverticulosis, duodenal ulcers, chronic systolic heart failure with EF of 35% presented to the ED with right eye blurry vision, right facial droop and left sided paraesthesias. CTs negative in ED, pPlan is MRI, Echo and Neurology reassessment, monitoring on Telemetry. MRI negative, pending echo results and neuro for dispo  pt still with facial droop, otherwise neuro intact

## 2022-01-16 NOTE — ED CDU PROVIDER SUBSEQUENT DAY NOTE - PROGRESS NOTE DETAILS
Pt feeling better after ER stay, pt seen and cleared by neuro, MRI neg for acute pathology, will treat for bells palsy. Echo report still not back as of yet but as MRI is negative for stroke will dc pt and she can f/u official report tomorrow. Pt to f/u with neuro as outpatient . stable for dc.

## 2022-01-16 NOTE — ED CDU PROVIDER DISPOSITION NOTE - ATTENDING CONTRIBUTION TO CARE
88 y/o female pmh htn, dm, parkinsons, CAD c/o R sided facial droop. Pt had normal CTA head and neck in the ER, was seen by neurology and sent to the CDU for tele monitoring and MRI to r/o CVA. Pt had no events overnight, MRI was negative for acute pathology an dpt was cleared by neuro for dc and rec to treat for possible bells palsy as cause for symptoms. Pt to f/u as outpatient with neuro.   pt agrees with plan, family notified

## 2022-01-16 NOTE — ED CDU PROVIDER SUBSEQUENT DAY NOTE - CRANIAL NERVE AND PUPILLARY EXAM
+ R sided facial droop. EOMI, 5/5 strength upper and lower extremity bilaterally. + intention tremor. No pronator drift.

## 2022-01-16 NOTE — ED CDU PROVIDER DISPOSITION NOTE - CARE PROVIDER_API CALL
Schoenberg, Laura G (DO)  Neurology  [address] [place] [address]  Phone: [phone]  Fax: [phone]  Follow Up Time:

## 2022-01-25 LAB — B BURGDOR DNA SPEC QL NAA+PROBE: NEGATIVE — SIGNIFICANT CHANGE UP

## 2022-02-08 ENCOUNTER — APPOINTMENT (OUTPATIENT)
Dept: CARDIOLOGY | Facility: CLINIC | Age: 87
End: 2022-02-08
Payer: MEDICARE

## 2022-02-08 ENCOUNTER — NON-APPOINTMENT (OUTPATIENT)
Age: 87
End: 2022-02-08

## 2022-02-08 VITALS
DIASTOLIC BLOOD PRESSURE: 77 MMHG | TEMPERATURE: 97 F | BODY MASS INDEX: 23.41 KG/M2 | OXYGEN SATURATION: 96 % | HEART RATE: 66 BPM | WEIGHT: 124 LBS | RESPIRATION RATE: 16 BRPM | SYSTOLIC BLOOD PRESSURE: 144 MMHG | HEIGHT: 61 IN

## 2022-02-08 PROCEDURE — 93000 ELECTROCARDIOGRAM COMPLETE: CPT

## 2022-02-08 PROCEDURE — 99214 OFFICE O/P EST MOD 30 MIN: CPT

## 2022-02-08 NOTE — CARDIOLOGY SUMMARY
[de-identified] : Sinus rhythm 60's with incomplete RBBB [de-identified] : \par 1/16/22: CONCLUSIONS:\par 1. Calcified trileaflet aortic valve with normal opening.\par Mild aortic regurgitation.\par 2. Moderate to severe segmental left ventricular systolic\par dysfunction. Mid to distal septum and  mid to distal\par anterior and inferior wall are severely hypokinetic. [place]\par is akinetic. Patient refused Definity echo contrast ,\par Unable to rule out/evaluate for  left ventricular thrombus.\par 3. The right ventricle is not well visualized; grossly\par normal right ventricular systolic function.\par 4. Normal tricuspid valve. Mild tricuspid regurgitation.\par 5. Agitated saline injection demonstrates evidence of a\par patent foramen ovale vs small ASD.\par *** Compared with echocardiogram of 2/16/2019, no\par significant changes noted.

## 2022-02-08 NOTE — HISTORY OF PRESENT ILLNESS
[FreeTextEntry1] : Here for followup\par Was overnight in the CDU 1/15-1/16/2022 with stroke symptoms. CT and MRI brain both done with white matter changes but no acute infarcts. Sent home with outpatient followup.\par \par 1/16/22: CONCLUSIONS:\par 1. Calcified trileaflet aortic valve with normal opening.\par Mild aortic regurgitation.\par 2. Moderate to severe segmental left ventricular systolic\par dysfunction. Mid to distal septum and  mid to distal\par anterior and inferior wall are severely hypokinetic. [place]\par is akinetic. Patient refused Definity echo contrast ,\par Unable to rule out/evaluate for  left ventricular thrombus.\par 3. The right ventricle is not well visualized; grossly\par normal right ventricular systolic function.\par 4. Normal tricuspid valve. Mild tricuspid regurgitation.\par 5. Agitated saline injection demonstrates evidence of a\par patent foramen ovale vs small ASD.\par *** Compared with echocardiogram of 2/16/2019, no\par significant changes noted.\par \par #CAD- AWMI 2014 with medical management. On ASA. Condition is stable. Continue present meds\par EKG unchanged\par #Chronic systolic heart failure- severe LVD, on Toprol 25 mg daily and Lisinopril 2.5 mg every other day. Continue present meds\par TTE last month showed possible PFO- patient on ASA \par #HTN- Controlled on Toprol and Lisinopril, continue present meds\par #HLD- On Statin therapy, continue present meds

## 2022-02-08 NOTE — DISCUSSION/SUMMARY
[FreeTextEntry1] : 88 year old woman with CAD (medically managed from MI in 2014), HTN HLD HFrEF here for followup\par #CAD- AWMI 2014 with medical management. On ASA. Condition is stable. Continue present meds\par EKG unchanged\par #Chronic systolic heart failure- severe LVD, on Toprol 25 mg daily and Lisinopril 2.5 mg every other day. Continue present meds\par TTE last month showed possible PFO- patient on ASA\par #HTN- Controlled on Toprol and Lisinopril, continue present meds\par #HLD- On Statin therapy, continue present meds\par \par FU 4 months

## 2022-05-02 ENCOUNTER — APPOINTMENT (OUTPATIENT)
Dept: CARDIOLOGY | Facility: CLINIC | Age: 87
End: 2022-05-02
Payer: MEDICARE

## 2022-05-02 VITALS
TEMPERATURE: 98.8 F | BODY MASS INDEX: 23.81 KG/M2 | SYSTOLIC BLOOD PRESSURE: 146 MMHG | HEIGHT: 61 IN | HEART RATE: 59 BPM | OXYGEN SATURATION: 97 % | DIASTOLIC BLOOD PRESSURE: 76 MMHG

## 2022-05-02 VITALS — BODY MASS INDEX: 23.81 KG/M2 | WEIGHT: 126 LBS

## 2022-05-02 PROCEDURE — 93000 ELECTROCARDIOGRAM COMPLETE: CPT

## 2022-05-02 PROCEDURE — 99214 OFFICE O/P EST MOD 30 MIN: CPT

## 2022-05-02 NOTE — HISTORY OF PRESENT ILLNESS
[FreeTextEntry1] : Here for followup\par Was overnight in the CDU 1/15-1/16/2022 with stroke symptoms. CT and MRI brain both done with white matter changes but no acute infarcts. Sent home with outpatient followup.\par \par 1/16/22: CONCLUSIONS:\par 1. Calcified trileaflet aortic valve with normal opening.\par Mild aortic regurgitation.\par 2. Moderate to severe segmental left ventricular systolic\par dysfunction. Mid to distal septum and  mid to distal\par anterior and inferior wall are severely hypokinetic. [place]\par is akinetic. Patient refused Definity echo contrast ,\par Unable to rule out/evaluate for  left ventricular thrombus.\par 3. The right ventricle is not well visualized; grossly\par normal right ventricular systolic function.\par 4. Normal tricuspid valve. Mild tricuspid regurgitation.\par 5. Agitated saline injection demonstrates evidence of a\par patent foramen ovale vs small ASD.\par *** Compared with echocardiogram of 2/16/2019, no\par significant changes noted.\par \par #CAD- AWMI 2014 with medical management. On ASA. Condition is stable. Continue present meds\par EKG unchanged\par #Chronic systolic heart failure- severe LVD, on Toprol 25 mg daily and Lisinopril 2.5 mg every other day. Continue present meds\par TTE last month showed possible PFO- patient on ASA \par #HTN- Controlled on Toprol and Lisinopril, continue present meds\par #HLD- On Statin therapy, continue present meds

## 2022-05-02 NOTE — CARDIOLOGY SUMMARY
[de-identified] : Sinus rhythm 60's with incomplete RBBB [de-identified] : \par 1/16/22: CONCLUSIONS:\par 1. Calcified trileaflet aortic valve with normal opening.\par Mild aortic regurgitation.\par 2. Moderate to severe segmental left ventricular systolic\par dysfunction. Mid to distal septum and  mid to distal\par anterior and inferior wall are severely hypokinetic. [place]\par is akinetic. Patient refused Definity echo contrast ,\par Unable to rule out/evaluate for  left ventricular thrombus.\par 3. The right ventricle is not well visualized; grossly\par normal right ventricular systolic function.\par 4. Normal tricuspid valve. Mild tricuspid regurgitation.\par 5. Agitated saline injection demonstrates evidence of a\par patent foramen ovale vs small ASD.\par *** Compared with echocardiogram of 2/16/2019, no\par significant changes noted.

## 2022-08-01 NOTE — PROGRESS NOTE ADULT - PROBLEM SELECTOR PROBLEM 1
Chest pain Split-Thickness Skin Graft Text: The defect edges were debeveled with a #15 scalpel blade.  Given the location of the defect, shape of the defect and the proximity to free margins a split thickness skin graft was deemed most appropriate.  Using a sterile surgical marker, the primary defect shape was transferred to the donor site. The split thickness graft was then harvested.  The skin graft was then placed in the primary defect and oriented appropriately.

## 2022-09-12 ENCOUNTER — NON-APPOINTMENT (OUTPATIENT)
Age: 87
End: 2022-09-12

## 2022-09-12 ENCOUNTER — APPOINTMENT (OUTPATIENT)
Dept: CARDIOLOGY | Facility: CLINIC | Age: 87
End: 2022-09-12

## 2022-09-12 VITALS
HEIGHT: 61 IN | OXYGEN SATURATION: 97 % | TEMPERATURE: 98.5 F | BODY MASS INDEX: 23.43 KG/M2 | SYSTOLIC BLOOD PRESSURE: 138 MMHG | DIASTOLIC BLOOD PRESSURE: 74 MMHG | HEART RATE: 64 BPM

## 2022-09-12 VITALS — WEIGHT: 124 LBS | BODY MASS INDEX: 23.43 KG/M2

## 2022-09-12 DIAGNOSIS — I50.22 CHRONIC SYSTOLIC (CONGESTIVE) HEART FAILURE: ICD-10-CM

## 2022-09-12 PROCEDURE — 93000 ELECTROCARDIOGRAM COMPLETE: CPT

## 2022-09-12 PROCEDURE — 99214 OFFICE O/P EST MOD 30 MIN: CPT

## 2022-09-12 NOTE — REASON FOR VISIT
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

## 2022-09-12 NOTE — DISCUSSION/SUMMARY
[FreeTextEntry1] : 89 year old woman with CAD (medically managed from MI in 2014), HTN HLD HFrEF here for followup\par #CAD- AWMI 2014 with medical management. On ASA. Condition is stable. Continue present meds\par EKG unchanged\par #Chronic systolic heart failure- severe LVD, on Toprol 25 mg daily and Lisinopril 2.5 mg every other day. Continue present meds\par TTE last month showed possible PFO- patient on ASA\par #HTN- Controlled on Toprol and Lisinopril, continue present meds\par #HLD- On Statin therapy, continue present meds\par \par FU 4 months [EKG obtained to assist in diagnosis and management of assessed problem(s)] : EKG obtained to assist in diagnosis and management of assessed problem(s)

## 2022-09-12 NOTE — HISTORY OF PRESENT ILLNESS
[FreeTextEntry1] : Here for followup\par Was overnight in the CDU 1/15-1/16/2022 with stroke symptoms. CT and MRI brain both done with white matter changes but no acute infarcts. Sent home with outpatient followup.\par \par 1/16/22: CONCLUSIONS:\par 1. Calcified trileaflet aortic valve with normal opening.\par Mild aortic regurgitation.\par 2. Moderate to severe segmental left ventricular systolic\par dysfunction. Mid to distal septum and  mid to distal\par anterior and inferior wall are severely hypokinetic. [place]\par is akinetic. Patient refused Definity echo contrast ,\par Unable to rule out/evaluate for  left ventricular thrombus.\par 3. The right ventricle is not well visualized; grossly\par normal right ventricular systolic function.\par 4. Normal tricuspid valve. Mild tricuspid regurgitation.\par 5. Agitated saline injection demonstrates evidence of a\par patent foramen ovale vs small ASD.\par *** Compared with echocardiogram of 2/16/2019, no\par significant changes noted.\par \par #CAD- AWMI 2014 with medical management. On ASA. Condition is stable. Continue present meds\par EKG unchanged\par #Chronic systolic heart failure- severe LVD, on Toprol 25 mg daily and Lisinopril 2.5 mg every other day. Continue present meds\par patient on ASA \par #HTN- Controlled on Toprol and Lisinopril, continue present meds\par #HLD- On Statin therapy, continue present meds

## 2022-09-12 NOTE — CARDIOLOGY SUMMARY
[de-identified] : Sinus rhythm 60's with incomplete RBBB [de-identified] : \par 1/16/22: CONCLUSIONS:\par 1. Calcified trileaflet aortic valve with normal opening.\par Mild aortic regurgitation.\par 2. Moderate to severe segmental left ventricular systolic\par dysfunction. Mid to distal septum and  mid to distal\par anterior and inferior wall are severely hypokinetic. [place]\par is akinetic. Patient refused Definity echo contrast ,\par Unable to rule out/evaluate for  left ventricular thrombus.\par 3. The right ventricle is not well visualized; grossly\par normal right ventricular systolic function.\par 4. Normal tricuspid valve. Mild tricuspid regurgitation.\par 5. Agitated saline injection demonstrates evidence of a\par patent foramen ovale vs small ASD.\par *** Compared with echocardiogram of 2/16/2019, no\par significant changes noted.

## 2023-03-06 ENCOUNTER — NON-APPOINTMENT (OUTPATIENT)
Age: 88
End: 2023-03-06

## 2023-03-06 ENCOUNTER — APPOINTMENT (OUTPATIENT)
Dept: CARDIOLOGY | Facility: CLINIC | Age: 88
End: 2023-03-06
Payer: MEDICARE

## 2023-03-06 VITALS
WEIGHT: 120 LBS | HEART RATE: 64 BPM | DIASTOLIC BLOOD PRESSURE: 71 MMHG | TEMPERATURE: 97.3 F | BODY MASS INDEX: 22.66 KG/M2 | SYSTOLIC BLOOD PRESSURE: 155 MMHG | HEIGHT: 61 IN | OXYGEN SATURATION: 95 %

## 2023-03-06 PROCEDURE — 99214 OFFICE O/P EST MOD 30 MIN: CPT

## 2023-03-06 PROCEDURE — 93000 ELECTROCARDIOGRAM COMPLETE: CPT

## 2023-03-06 NOTE — DISCUSSION/SUMMARY
[FreeTextEntry1] : 89 year old woman with CAD (medically managed from MI in 2014), HTN HLD HFrEF here for followup\par #CAD- AWMI 2014 with medical management. On ASA. Condition is stable. Continue present meds\par EKG unchanged\par #Chronic systolic heart failure- severe LVD, on Toprol 25 mg daily and Lisinopril 2.5 mg every other day. Continue present meds\par TTE last month showed possible PFO- patient on ASA\par #HTN- Controlled on Toprol and Lisinopril, continue present meds\par #HLD- On Statin therapy, continue present meds\par \par FU 4 months [EKG obtained to assist in diagnosis and management of assessed problem(s)] : EKG obtained to assist in diagnosis and management of assessed problem(s)

## 2023-03-06 NOTE — REASON FOR VISIT
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

## 2023-03-06 NOTE — CARDIOLOGY SUMMARY
[de-identified] : Sinus rhythm 60's with incomplete RBBB [de-identified] : \par 1/16/22: CONCLUSIONS:\par 1. Calcified trileaflet aortic valve with normal opening.\par Mild aortic regurgitation.\par 2. Moderate to severe segmental left ventricular systolic\par dysfunction. Mid to distal septum and  mid to distal\par anterior and inferior wall are severely hypokinetic. [place]\par is akinetic. Patient refused Definity echo contrast ,\par Unable to rule out/evaluate for  left ventricular thrombus.\par 3. The right ventricle is not well visualized; grossly\par normal right ventricular systolic function.\par 4. Normal tricuspid valve. Mild tricuspid regurgitation.\par 5. Agitated saline injection demonstrates evidence of a\par patent foramen ovale vs small ASD.\par *** Compared with echocardiogram of 2/16/2019, no\par significant changes noted.

## 2023-03-06 NOTE — HISTORY OF PRESENT ILLNESS
[FreeTextEntry1] : Here for followup\par Was overnight in the [facility] 1/15-1/16/2022 with stroke symptoms. CT and MRI brain both done with white matter changes but no acute infarcts. Sent home with outpatient followup.\par \par 1/16/22: CONCLUSIONS:\par 1. Calcified trileaflet aortic valve with normal opening.\par Mild aortic regurgitation.\par 2. Moderate to severe segmental left ventricular systolic\par dysfunction. Mid to distal septum and  mid to distal\par anterior and inferior wall are severely hypokinetic. 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 Patient refused Definity echo contrast ,\par Unable to rule out/evaluate for  left ventricular thrombus.\par 3. The right ventricle is not well visualized; grossly\par normal right ventricular systolic function.\par 4. Normal tricuspid valve. Mild tricuspid regurgitation.\par 5. Agitated saline injection demonstrates evidence of a\par patent foramen ovale vs small ASD.\par *** Compared with echocardiogram of 2/16/2019, no\par significant changes noted.\par \par #CAD- AWMI 2014 with medical management. On ASA. Condition is stable. Continue present meds\par EKG unchanged\par #Chronic systolic heart failure- severe LVD, on Toprol 25 mg daily and Lisinopril 2.5 mg every other day. Continue present meds\par patient on ASA \par #HTN- Controlled on Toprol and Lisinopril, continue present meds\par #HLD- On Statin therapy, continue present meds

## 2023-04-10 NOTE — STROKE CODE NOTE - NIH STROKE SCALE: 3. VISUAL, QM
(0) No visual loss
,[email or web address]

## 2023-09-11 ENCOUNTER — NON-APPOINTMENT (OUTPATIENT)
Age: 88
End: 2023-09-11

## 2023-09-11 ENCOUNTER — APPOINTMENT (OUTPATIENT)
Dept: CARDIOLOGY | Facility: CLINIC | Age: 88
End: 2023-09-11
Payer: MEDICARE

## 2023-09-11 VITALS
OXYGEN SATURATION: 90 % | HEIGHT: 61 IN | HEART RATE: 250 BPM | SYSTOLIC BLOOD PRESSURE: 148 MMHG | DIASTOLIC BLOOD PRESSURE: 73 MMHG

## 2023-09-11 DIAGNOSIS — I25.10 ATHEROSCLEROTIC HEART DISEASE OF NATIVE CORONARY ARTERY W/OUT ANGINA PECTORIS: ICD-10-CM

## 2023-09-11 PROCEDURE — 99214 OFFICE O/P EST MOD 30 MIN: CPT

## 2023-09-11 PROCEDURE — 93000 ELECTROCARDIOGRAM COMPLETE: CPT

## 2024-03-11 ENCOUNTER — NON-APPOINTMENT (OUTPATIENT)
Age: 89
End: 2024-03-11

## 2024-03-11 ENCOUNTER — APPOINTMENT (OUTPATIENT)
Dept: CARDIOLOGY | Facility: CLINIC | Age: 89
End: 2024-03-11
Payer: MEDICARE

## 2024-03-11 VITALS
WEIGHT: 120 LBS | HEIGHT: 61 IN | OXYGEN SATURATION: 93 % | HEART RATE: 61 BPM | DIASTOLIC BLOOD PRESSURE: 87 MMHG | SYSTOLIC BLOOD PRESSURE: 154 MMHG | BODY MASS INDEX: 22.66 KG/M2

## 2024-03-11 DIAGNOSIS — I10 ESSENTIAL (PRIMARY) HYPERTENSION: ICD-10-CM

## 2024-03-11 DIAGNOSIS — I21.9 ACUTE MYOCARDIAL INFARCTION, UNSPECIFIED: ICD-10-CM

## 2024-03-11 DIAGNOSIS — E78.5 HYPERLIPIDEMIA, UNSPECIFIED: ICD-10-CM

## 2024-03-11 DIAGNOSIS — I25.10 ATHEROSCLEROTIC HEART DISEASE OF NATIVE CORONARY ARTERY W/OUT ANGINA PECTORIS: ICD-10-CM

## 2024-03-11 PROCEDURE — G2211 COMPLEX E/M VISIT ADD ON: CPT

## 2024-03-11 PROCEDURE — 93000 ELECTROCARDIOGRAM COMPLETE: CPT

## 2024-03-11 PROCEDURE — 99214 OFFICE O/P EST MOD 30 MIN: CPT

## 2024-03-11 NOTE — HISTORY OF PRESENT ILLNESS
[FreeTextEntry1] : Here for followup   1/16/22: CONCLUSIONS: 1. Calcified trileaflet aortic valve with normal opening. Mild aortic regurgitation. 2. Moderate to severe segmental left ventricular systolic dysfunction. Mid to distal septum and  mid to distal anterior and inferior wall are severely hypokinetic. [place] is akinetic. Patient refused Definity echo contrast , Unable to rule out/evaluate for  left ventricular thrombus. 3. The right ventricle is not well visualized; grossly normal right ventricular systolic function. 4. Normal tricuspid valve. Mild tricuspid regurgitation. 5. Agitated saline injection demonstrates evidence of a patent foramen ovale vs small ASD. *** Compared with echocardiogram of 2/16/2019, no significant changes noted.  #CAD- AWMI 2014 with medical management. On ASA. Condition is stable. Continue present meds EKG unchanged #Chronic systolic heart failure- severe LVD, on Toprol 25 mg daily and Lisinopril 2.5 mg every other day. Continue present meds patient on ASA  #HTN- Controlled on Toprol and Lisinopril, continue present meds #HLD- On Statin therapy, continue present meds

## 2024-03-11 NOTE — DISCUSSION/SUMMARY
[EKG obtained to assist in diagnosis and management of assessed problem(s)] : EKG obtained to assist in diagnosis and management of assessed problem(s) [FreeTextEntry1] : 89 year old woman with CAD (medically managed from MI in 2014), HTN HLD HFrEF here for followup #CAD- AWMI 2014 with medical management. On ASA. Condition is stable. Continue present meds EKG unchanged #Chronic systolic heart failure- severe LVD, on Toprol 25 mg daily and Lisinopril 2.5 mg every other day. Continue present meds TTE last month showed possible PFO- patient on ASA #HTN- Controlled on Toprol and Lisinopril, continue present meds #HLD- On Statin therapy, continue present meds  FU 6 months

## 2024-03-11 NOTE — PHYSICAL EXAM
[Well Developed] : well developed [Well Nourished] : well nourished [No Acute Distress] : no acute distress [Normal Venous Pressure] : normal venous pressure [Normal Conjunctiva] : normal conjunctiva [No Carotid Bruit] : no carotid bruit [Normal S1, S2] : normal S1, S2 [No Murmur] : no murmur [No Gallop] : no gallop [No Rub] : no rub [Clear Lung Fields] : clear lung fields [Good Air Entry] : good air entry [Soft] : abdomen soft [No Respiratory Distress] : no respiratory distress  [No Masses/organomegaly] : no masses/organomegaly [Non Tender] : non-tender [Normal Bowel Sounds] : normal bowel sounds [No Edema] : no edema [Normal Gait] : normal gait [No Cyanosis] : no cyanosis [No Clubbing] : no clubbing [No Varicosities] : no varicosities [No Rash] : no rash [Moves all extremities] : moves all extremities [No Skin Lesions] : no skin lesions [No Focal Deficits] : no focal deficits [Alert and Oriented] : alert and oriented [Normal Speech] : normal speech [Normal memory] : normal memory

## 2024-03-11 NOTE — CARDIOLOGY SUMMARY
[de-identified] : Sinus rhythm 60's with incomplete RBBB [de-identified] : \par  1/16/22: CONCLUSIONS:\par  1. Calcified trileaflet aortic valve with normal opening.\par  Mild aortic regurgitation.\par  2. Moderate to severe segmental left ventricular systolic\par  dysfunction. Mid to distal septum and  mid to distal\par  anterior and inferior wall are severely hypokinetic. [place]\par  is akinetic. Patient refused Definity echo contrast ,\par  Unable to rule out/evaluate for  left ventricular thrombus.\par  3. The right ventricle is not well visualized; grossly\par  normal right ventricular systolic function.\par  4. Normal tricuspid valve. Mild tricuspid regurgitation.\par  5. Agitated saline injection demonstrates evidence of a\par  patent foramen ovale vs small ASD.\par  *** Compared with echocardiogram of 2/16/2019, no\par  significant changes noted.

## 2024-04-03 ENCOUNTER — RX RENEWAL (OUTPATIENT)
Age: 89
End: 2024-04-03

## 2024-09-09 ENCOUNTER — APPOINTMENT (OUTPATIENT)
Dept: CARDIOLOGY | Facility: CLINIC | Age: 89
End: 2024-09-09
Payer: MEDICARE

## 2024-09-09 ENCOUNTER — NON-APPOINTMENT (OUTPATIENT)
Age: 89
End: 2024-09-09

## 2024-09-09 VITALS
OXYGEN SATURATION: 97 % | BODY MASS INDEX: 23.24 KG/M2 | TEMPERATURE: 97.7 F | SYSTOLIC BLOOD PRESSURE: 129 MMHG | HEART RATE: 60 BPM | DIASTOLIC BLOOD PRESSURE: 68 MMHG | WEIGHT: 123 LBS | RESPIRATION RATE: 16 BRPM

## 2024-09-09 DIAGNOSIS — I25.10 ATHEROSCLEROTIC HEART DISEASE OF NATIVE CORONARY ARTERY W/OUT ANGINA PECTORIS: ICD-10-CM

## 2024-09-09 DIAGNOSIS — E78.5 HYPERLIPIDEMIA, UNSPECIFIED: ICD-10-CM

## 2024-09-09 DIAGNOSIS — I50.22 CHRONIC SYSTOLIC (CONGESTIVE) HEART FAILURE: ICD-10-CM

## 2024-09-09 PROCEDURE — G2211 COMPLEX E/M VISIT ADD ON: CPT

## 2024-09-09 PROCEDURE — 93000 ELECTROCARDIOGRAM COMPLETE: CPT

## 2024-09-09 PROCEDURE — 99214 OFFICE O/P EST MOD 30 MIN: CPT

## 2024-09-09 NOTE — CARDIOLOGY SUMMARY
[de-identified] : Sinus rhythm 60's with incomplete RBBB [de-identified] : \par  1/16/22: CONCLUSIONS:\par  1. Calcified trileaflet aortic valve with normal opening.\par  Mild aortic regurgitation.\par  2. Moderate to severe segmental left ventricular systolic\par  dysfunction. Mid to distal septum and  mid to distal\par  anterior and inferior wall are severely hypokinetic. [place]\par  is akinetic. Patient refused Definity echo contrast ,\par  Unable to rule out/evaluate for  left ventricular thrombus.\par  3. The right ventricle is not well visualized; grossly\par  normal right ventricular systolic function.\par  4. Normal tricuspid valve. Mild tricuspid regurgitation.\par  5. Agitated saline injection demonstrates evidence of a\par  patent foramen ovale vs small ASD.\par  *** Compared with echocardiogram of 2/16/2019, no\par  significant changes noted.

## 2024-09-09 NOTE — DISCUSSION/SUMMARY
[EKG obtained to assist in diagnosis and management of assessed problem(s)] : EKG obtained to assist in diagnosis and management of assessed problem(s) [FreeTextEntry1] : 89 year old woman with CAD (medically managed from MI in 2014), HTN HLD HFrEF here for followup #CAD- AWMI 2014 with medical management. On ASA. Condition is stable. Continue present meds EKG unchanged #Chronic systolic heart failure- severe LVD, on Toprol 25 mg daily and Lisinopril 2.5 mg every other day. Continue present meds TTE possible PFO- patient on ASA #HTN- Controlled on Toprol and Lisinopril, continue present meds #HLD- On Statin therapy, continue present meds  FU 6 months

## 2024-09-09 NOTE — HISTORY OF PRESENT ILLNESS
[FreeTextEntry1] : Here for followup  1/16/22: CONCLUSIONS: 1. Calcified trileaflet aortic valve with normal opening. Mild aortic regurgitation. 2. Moderate to severe segmental left ventricular systolic dysfunction. Mid to distal septum and  mid to distal anterior and inferior wall are severely hypokinetic. [place] is akinetic. Patient refused Definity echo contrast , Unable to rule out/evaluate for  left ventricular thrombus. 3. The right ventricle is not well visualized; grossly normal right ventricular systolic function. 4. Normal tricuspid valve. Mild tricuspid regurgitation. 5. Agitated saline injection demonstrates evidence of a patent foramen ovale vs small ASD. *** Compared with echocardiogram of 2/16/2019, no significant changes noted.  #CAD- AWMI 2014 with medical management. On ASA. Condition is stable. Continue present meds EKG unchanged #Chronic systolic heart failure- severe LVD, on Toprol 25 mg daily and Lisinopril 2.5 mg every other day. Continue present meds patient on ASA  #HTN- Controlled on Toprol and Lisinopril, continue present meds #HLD- On Statin therapy, continue present meds

## 2025-01-14 ENCOUNTER — NON-APPOINTMENT (OUTPATIENT)
Age: 89
End: 2025-01-14

## 2025-01-21 ENCOUNTER — NON-APPOINTMENT (OUTPATIENT)
Age: 89
End: 2025-01-21

## 2025-01-23 ENCOUNTER — NON-APPOINTMENT (OUTPATIENT)
Age: 89
End: 2025-01-23

## 2025-03-10 ENCOUNTER — NON-APPOINTMENT (OUTPATIENT)
Age: 89
End: 2025-03-10

## 2025-03-10 ENCOUNTER — APPOINTMENT (OUTPATIENT)
Dept: CARDIOLOGY | Facility: CLINIC | Age: 89
End: 2025-03-10
Payer: MEDICARE

## 2025-03-10 VITALS
HEIGHT: 61 IN | WEIGHT: 123 LBS | BODY MASS INDEX: 23.22 KG/M2 | DIASTOLIC BLOOD PRESSURE: 71 MMHG | OXYGEN SATURATION: 96 % | SYSTOLIC BLOOD PRESSURE: 150 MMHG

## 2025-03-10 DIAGNOSIS — I25.10 ATHEROSCLEROTIC HEART DISEASE OF NATIVE CORONARY ARTERY W/OUT ANGINA PECTORIS: ICD-10-CM

## 2025-03-10 DIAGNOSIS — E78.5 HYPERLIPIDEMIA, UNSPECIFIED: ICD-10-CM

## 2025-03-10 DIAGNOSIS — I10 ESSENTIAL (PRIMARY) HYPERTENSION: ICD-10-CM

## 2025-03-10 PROCEDURE — 99215 OFFICE O/P EST HI 40 MIN: CPT

## 2025-03-10 PROCEDURE — 93000 ELECTROCARDIOGRAM COMPLETE: CPT

## 2025-03-10 PROCEDURE — G2211 COMPLEX E/M VISIT ADD ON: CPT

## 2025-04-03 ENCOUNTER — NON-APPOINTMENT (OUTPATIENT)
Age: 89
End: 2025-04-03

## 2025-09-03 ENCOUNTER — INPATIENT (INPATIENT)
Facility: HOSPITAL | Age: 89
LOS: 0 days | Discharge: HOME | End: 2025-09-04
Attending: STUDENT IN AN ORGANIZED HEALTH CARE EDUCATION/TRAINING PROGRAM | Admitting: STUDENT IN AN ORGANIZED HEALTH CARE EDUCATION/TRAINING PROGRAM
Payer: MEDICARE

## 2025-09-03 VITALS
HEIGHT: 59.06 IN | HEART RATE: 66 BPM | OXYGEN SATURATION: 95 % | WEIGHT: 119.93 LBS | SYSTOLIC BLOOD PRESSURE: 183 MMHG | TEMPERATURE: 98 F | DIASTOLIC BLOOD PRESSURE: 75 MMHG | RESPIRATION RATE: 17 BRPM

## 2025-09-03 DIAGNOSIS — R07.9 CHEST PAIN, UNSPECIFIED: ICD-10-CM

## 2025-09-03 DIAGNOSIS — M54.2 CERVICALGIA: ICD-10-CM

## 2025-09-03 DIAGNOSIS — D35.00 BENIGN NEOPLASM OF UNSPECIFIED ADRENAL GLAND: ICD-10-CM

## 2025-09-03 DIAGNOSIS — I50.22 CHRONIC SYSTOLIC (CONGESTIVE) HEART FAILURE: ICD-10-CM

## 2025-09-03 DIAGNOSIS — I10 ESSENTIAL (PRIMARY) HYPERTENSION: ICD-10-CM

## 2025-09-03 DIAGNOSIS — Z43.4 ENCOUNTER FOR ATTENTION TO OTHER ARTIFICIAL OPENINGS OF DIGESTIVE TRACT: Chronic | ICD-10-CM

## 2025-09-03 DIAGNOSIS — I25.10 ATHEROSCLEROTIC HEART DISEASE OF NATIVE CORONARY ARTERY WITHOUT ANGINA PECTORIS: ICD-10-CM

## 2025-09-03 DIAGNOSIS — Z29.9 ENCOUNTER FOR PROPHYLACTIC MEASURES, UNSPECIFIED: ICD-10-CM

## 2025-09-03 LAB
ADD ON TEST-SPECIMEN IN LAB: SIGNIFICANT CHANGE UP
ALBUMIN SERPL ELPH-MCNC: 4.3 G/DL — SIGNIFICANT CHANGE UP (ref 3.3–5)
ALP SERPL-CCNC: 56 U/L — SIGNIFICANT CHANGE UP (ref 40–120)
ALT FLD-CCNC: 22 U/L — SIGNIFICANT CHANGE UP (ref 4–33)
ANION GAP SERPL CALC-SCNC: 12 MMOL/L — SIGNIFICANT CHANGE UP (ref 7–14)
APTT BLD: 30 SEC — SIGNIFICANT CHANGE UP (ref 26.1–36.8)
AST SERPL-CCNC: 27 U/L — SIGNIFICANT CHANGE UP (ref 4–32)
BASOPHILS # BLD AUTO: 0.03 K/UL — SIGNIFICANT CHANGE UP (ref 0–0.2)
BASOPHILS NFR BLD AUTO: 0.4 % — SIGNIFICANT CHANGE UP (ref 0–2)
BILIRUB SERPL-MCNC: 0.5 MG/DL — SIGNIFICANT CHANGE UP (ref 0.2–1.2)
BUN SERPL-MCNC: 23 MG/DL — SIGNIFICANT CHANGE UP (ref 7–23)
CALCIUM SERPL-MCNC: 9.8 MG/DL — SIGNIFICANT CHANGE UP (ref 8.4–10.5)
CHLORIDE SERPL-SCNC: 105 MMOL/L — SIGNIFICANT CHANGE UP (ref 98–107)
CO2 SERPL-SCNC: 26 MMOL/L — SIGNIFICANT CHANGE UP (ref 22–31)
CREAT SERPL-MCNC: 0.96 MG/DL — SIGNIFICANT CHANGE UP (ref 0.5–1.3)
EGFR: 55 ML/MIN/1.73M2 — LOW
EGFR: 55 ML/MIN/1.73M2 — LOW
EOSINOPHIL # BLD AUTO: 0.06 K/UL — SIGNIFICANT CHANGE UP (ref 0–0.5)
EOSINOPHIL NFR BLD AUTO: 0.8 % — SIGNIFICANT CHANGE UP (ref 0–6)
GLUCOSE SERPL-MCNC: 119 MG/DL — HIGH (ref 70–99)
HCT VFR BLD CALC: 41.2 % — SIGNIFICANT CHANGE UP (ref 34.5–45)
HGB BLD-MCNC: 13.7 G/DL — SIGNIFICANT CHANGE UP (ref 11.5–15.5)
IMM GRANULOCYTES # BLD AUTO: 0.11 K/UL — HIGH (ref 0–0.07)
IMM GRANULOCYTES NFR BLD AUTO: 1.5 % — HIGH (ref 0–0.9)
INR BLD: 0.94 RATIO — SIGNIFICANT CHANGE UP (ref 0.85–1.16)
LYMPHOCYTES # BLD AUTO: 1.23 K/UL — SIGNIFICANT CHANGE UP (ref 1–3.3)
LYMPHOCYTES NFR BLD AUTO: 17.3 % — SIGNIFICANT CHANGE UP (ref 13–44)
MCHC RBC-ENTMCNC: 31.5 PG — SIGNIFICANT CHANGE UP (ref 27–34)
MCHC RBC-ENTMCNC: 33.3 G/DL — SIGNIFICANT CHANGE UP (ref 32–36)
MCV RBC AUTO: 94.7 FL — SIGNIFICANT CHANGE UP (ref 80–100)
MONOCYTES # BLD AUTO: 0.63 K/UL — SIGNIFICANT CHANGE UP (ref 0–0.9)
MONOCYTES NFR BLD AUTO: 8.9 % — SIGNIFICANT CHANGE UP (ref 2–14)
NEUTROPHILS # BLD AUTO: 5.05 K/UL — SIGNIFICANT CHANGE UP (ref 1.8–7.4)
NEUTROPHILS NFR BLD AUTO: 71.1 % — SIGNIFICANT CHANGE UP (ref 43–77)
NRBC # BLD AUTO: 0 K/UL — SIGNIFICANT CHANGE UP (ref 0–0)
NRBC # FLD: 0 K/UL — SIGNIFICANT CHANGE UP (ref 0–0)
NRBC BLD AUTO-RTO: 0 /100 WBCS — SIGNIFICANT CHANGE UP (ref 0–0)
NT-PROBNP SERPL-SCNC: 3618 PG/ML — HIGH
PLATELET # BLD AUTO: 173 K/UL — SIGNIFICANT CHANGE UP (ref 150–400)
PMV BLD: 11.4 FL — SIGNIFICANT CHANGE UP (ref 7–13)
POTASSIUM SERPL-MCNC: 4.9 MMOL/L — SIGNIFICANT CHANGE UP (ref 3.5–5.3)
POTASSIUM SERPL-SCNC: 4.9 MMOL/L — SIGNIFICANT CHANGE UP (ref 3.5–5.3)
PROT SERPL-MCNC: 7.2 G/DL — SIGNIFICANT CHANGE UP (ref 6–8.3)
PROTHROM AB SERPL-ACNC: 10.9 SEC — SIGNIFICANT CHANGE UP (ref 9.9–13.4)
RBC # BLD: 4.35 M/UL — SIGNIFICANT CHANGE UP (ref 3.8–5.2)
RBC # FLD: 13.4 % — SIGNIFICANT CHANGE UP (ref 10.3–14.5)
SODIUM SERPL-SCNC: 143 MMOL/L — SIGNIFICANT CHANGE UP (ref 135–145)
T4 FREE SERPL-MCNC: 1 NG/DL — SIGNIFICANT CHANGE UP (ref 0.9–1.8)
TROPONIN T, HIGH SENSITIVITY RESULT: 19 NG/L — HIGH
TROPONIN T, HIGH SENSITIVITY RESULT: 22 NG/L — HIGH
TSH SERPL-MCNC: 1.16 UIU/ML — SIGNIFICANT CHANGE UP (ref 0.27–4.2)
WBC # BLD: 7.11 K/UL — SIGNIFICANT CHANGE UP (ref 3.8–10.5)
WBC # FLD AUTO: 7.11 K/UL — SIGNIFICANT CHANGE UP (ref 3.8–10.5)

## 2025-09-03 PROCEDURE — 99223 1ST HOSP IP/OBS HIGH 75: CPT

## 2025-09-03 PROCEDURE — 99285 EMERGENCY DEPT VISIT HI MDM: CPT | Mod: GC

## 2025-09-03 PROCEDURE — 71275 CT ANGIOGRAPHY CHEST: CPT | Mod: 26

## 2025-09-03 PROCEDURE — 74174 CTA ABD&PLVS W/CONTRAST: CPT | Mod: 26

## 2025-09-03 RX ORDER — MELATONIN 5 MG
3 TABLET ORAL AT BEDTIME
Refills: 0 | Status: DISCONTINUED | OUTPATIENT
Start: 2025-09-03 | End: 2025-09-04

## 2025-09-03 RX ORDER — LIDOCAINE HYDROCHLORIDE 20 MG/ML
1 JELLY TOPICAL EVERY 24 HOURS
Refills: 0 | Status: DISCONTINUED | OUTPATIENT
Start: 2025-09-03 | End: 2025-09-04

## 2025-09-03 RX ORDER — ASPIRIN 325 MG
81 TABLET ORAL DAILY
Refills: 0 | Status: DISCONTINUED | OUTPATIENT
Start: 2025-09-03 | End: 2025-09-04

## 2025-09-03 RX ORDER — LISINOPRIL 5 MG/1
1 TABLET ORAL
Refills: 0 | DISCHARGE

## 2025-09-03 RX ORDER — METOPROLOL SUCCINATE 50 MG/1
37.5 TABLET, EXTENDED RELEASE ORAL DAILY
Refills: 0 | Status: DISCONTINUED | OUTPATIENT
Start: 2025-09-03 | End: 2025-09-04

## 2025-09-03 RX ORDER — ATORVASTATIN CALCIUM 80 MG/1
1 TABLET, FILM COATED ORAL
Refills: 0 | DISCHARGE

## 2025-09-03 RX ORDER — ATORVASTATIN CALCIUM 80 MG/1
20 TABLET, FILM COATED ORAL AT BEDTIME
Refills: 0 | Status: DISCONTINUED | OUTPATIENT
Start: 2025-09-03 | End: 2025-09-04

## 2025-09-03 RX ORDER — ACETAMINOPHEN 500 MG/5ML
650 LIQUID (ML) ORAL EVERY 6 HOURS
Refills: 0 | Status: DISCONTINUED | OUTPATIENT
Start: 2025-09-03 | End: 2025-09-04

## 2025-09-03 RX ORDER — LISINOPRIL 5 MG/1
2.5 TABLET ORAL AT BEDTIME
Refills: 0 | Status: DISCONTINUED | OUTPATIENT
Start: 2025-09-03 | End: 2025-09-04

## 2025-09-03 RX ORDER — HEPARIN SODIUM 1000 [USP'U]/ML
5000 INJECTION INTRAVENOUS; SUBCUTANEOUS EVERY 8 HOURS
Refills: 0 | Status: DISCONTINUED | OUTPATIENT
Start: 2025-09-03 | End: 2025-09-04

## 2025-09-03 RX ADMIN — Medication 81 MILLIGRAM(S): at 21:56

## 2025-09-03 RX ADMIN — ATORVASTATIN CALCIUM 20 MILLIGRAM(S): 80 TABLET, FILM COATED ORAL at 21:55

## 2025-09-03 RX ADMIN — HEPARIN SODIUM 5000 UNIT(S): 1000 INJECTION INTRAVENOUS; SUBCUTANEOUS at 21:56

## 2025-09-03 RX ADMIN — METOPROLOL SUCCINATE 37.5 MILLIGRAM(S): 50 TABLET, EXTENDED RELEASE ORAL at 21:58

## 2025-09-03 RX ADMIN — LISINOPRIL 2.5 MILLIGRAM(S): 5 TABLET ORAL at 21:59

## 2025-09-04 ENCOUNTER — RESULT REVIEW (OUTPATIENT)
Age: 89
End: 2025-09-04

## 2025-09-04 ENCOUNTER — TRANSCRIPTION ENCOUNTER (OUTPATIENT)
Age: 89
End: 2025-09-04

## 2025-09-04 VITALS
OXYGEN SATURATION: 97 % | RESPIRATION RATE: 18 BRPM | TEMPERATURE: 98 F | HEART RATE: 67 BPM | SYSTOLIC BLOOD PRESSURE: 132 MMHG | DIASTOLIC BLOOD PRESSURE: 62 MMHG

## 2025-09-04 LAB
ANION GAP SERPL CALC-SCNC: 11 MMOL/L — SIGNIFICANT CHANGE UP (ref 7–14)
BUN SERPL-MCNC: 27 MG/DL — HIGH (ref 7–23)
CALCIUM SERPL-MCNC: 9.1 MG/DL — SIGNIFICANT CHANGE UP (ref 8.4–10.5)
CHLORIDE SERPL-SCNC: 103 MMOL/L — SIGNIFICANT CHANGE UP (ref 98–107)
CK MB BLD-MCNC: 2 % — SIGNIFICANT CHANGE UP (ref 0–2.5)
CK MB CFR SERPL CALC: 2.9 NG/ML — SIGNIFICANT CHANGE UP
CK SERPL-CCNC: 147 U/L — SIGNIFICANT CHANGE UP (ref 25–170)
CO2 SERPL-SCNC: 25 MMOL/L — SIGNIFICANT CHANGE UP (ref 22–31)
CREAT SERPL-MCNC: 0.92 MG/DL — SIGNIFICANT CHANGE UP (ref 0.5–1.3)
EGFR: 58 ML/MIN/1.73M2 — LOW
EGFR: 58 ML/MIN/1.73M2 — LOW
GLUCOSE SERPL-MCNC: 143 MG/DL — HIGH (ref 70–99)
HCT VFR BLD CALC: 39.9 % — SIGNIFICANT CHANGE UP (ref 34.5–45)
HGB BLD-MCNC: 13.3 G/DL — SIGNIFICANT CHANGE UP (ref 11.5–15.5)
MAGNESIUM SERPL-MCNC: 1.9 MG/DL — SIGNIFICANT CHANGE UP (ref 1.6–2.6)
MCHC RBC-ENTMCNC: 31.5 PG — SIGNIFICANT CHANGE UP (ref 27–34)
MCHC RBC-ENTMCNC: 33.3 G/DL — SIGNIFICANT CHANGE UP (ref 32–36)
MCV RBC AUTO: 94.5 FL — SIGNIFICANT CHANGE UP (ref 80–100)
NRBC # BLD AUTO: 0 K/UL — SIGNIFICANT CHANGE UP (ref 0–0)
NRBC # FLD: 0 K/UL — SIGNIFICANT CHANGE UP (ref 0–0)
NRBC BLD AUTO-RTO: 0 /100 WBCS — SIGNIFICANT CHANGE UP (ref 0–0)
PHOSPHATE SERPL-MCNC: 3 MG/DL — SIGNIFICANT CHANGE UP (ref 2.5–4.5)
PLATELET # BLD AUTO: 170 K/UL — SIGNIFICANT CHANGE UP (ref 150–400)
PMV BLD: 11.2 FL — SIGNIFICANT CHANGE UP (ref 7–13)
POTASSIUM SERPL-MCNC: 4.2 MMOL/L — SIGNIFICANT CHANGE UP (ref 3.5–5.3)
POTASSIUM SERPL-SCNC: 4.2 MMOL/L — SIGNIFICANT CHANGE UP (ref 3.5–5.3)
RBC # BLD: 4.22 M/UL — SIGNIFICANT CHANGE UP (ref 3.8–5.2)
RBC # FLD: 13.4 % — SIGNIFICANT CHANGE UP (ref 10.3–14.5)
SODIUM SERPL-SCNC: 139 MMOL/L — SIGNIFICANT CHANGE UP (ref 135–145)
TROPONIN T, HIGH SENSITIVITY RESULT: 19 NG/L — HIGH
WBC # BLD: 7.38 K/UL — SIGNIFICANT CHANGE UP (ref 3.8–10.5)
WBC # FLD AUTO: 7.38 K/UL — SIGNIFICANT CHANGE UP (ref 3.8–10.5)

## 2025-09-04 PROCEDURE — 99233 SBSQ HOSP IP/OBS HIGH 50: CPT

## 2025-09-04 PROCEDURE — 99239 HOSP IP/OBS DSCHRG MGMT >30: CPT

## 2025-09-04 RX ADMIN — HEPARIN SODIUM 5000 UNIT(S): 1000 INJECTION INTRAVENOUS; SUBCUTANEOUS at 06:31

## 2025-09-04 RX ADMIN — METOPROLOL SUCCINATE 37.5 MILLIGRAM(S): 50 TABLET, EXTENDED RELEASE ORAL at 06:30

## 2025-09-06 ENCOUNTER — TRANSCRIPTION ENCOUNTER (OUTPATIENT)
Age: 89
End: 2025-09-06

## 2025-09-15 ENCOUNTER — APPOINTMENT (OUTPATIENT)
Dept: CARDIOLOGY | Facility: CLINIC | Age: 89
End: 2025-09-15
Payer: MEDICARE

## 2025-09-15 VITALS
HEIGHT: 61 IN | WEIGHT: 121 LBS | SYSTOLIC BLOOD PRESSURE: 156 MMHG | DIASTOLIC BLOOD PRESSURE: 71 MMHG | HEART RATE: 63 BPM | BODY MASS INDEX: 22.84 KG/M2 | OXYGEN SATURATION: 97 %

## 2025-09-15 VITALS — SYSTOLIC BLOOD PRESSURE: 136 MMHG | DIASTOLIC BLOOD PRESSURE: 72 MMHG

## 2025-09-15 DIAGNOSIS — I25.10 ATHEROSCLEROTIC HEART DISEASE OF NATIVE CORONARY ARTERY W/OUT ANGINA PECTORIS: ICD-10-CM

## 2025-09-15 DIAGNOSIS — I10 ESSENTIAL (PRIMARY) HYPERTENSION: ICD-10-CM

## 2025-09-15 DIAGNOSIS — I50.22 CHRONIC SYSTOLIC (CONGESTIVE) HEART FAILURE: ICD-10-CM

## 2025-09-15 PROCEDURE — G2211 COMPLEX E/M VISIT ADD ON: CPT

## 2025-09-15 PROCEDURE — 93000 ELECTROCARDIOGRAM COMPLETE: CPT

## 2025-09-15 PROCEDURE — 99215 OFFICE O/P EST HI 40 MIN: CPT
